# Patient Record
Sex: MALE | Race: WHITE | NOT HISPANIC OR LATINO
[De-identification: names, ages, dates, MRNs, and addresses within clinical notes are randomized per-mention and may not be internally consistent; named-entity substitution may affect disease eponyms.]

---

## 2017-01-18 ENCOUNTER — FORM ENCOUNTER (OUTPATIENT)
Age: 77
End: 2017-01-18

## 2017-01-19 ENCOUNTER — APPOINTMENT (OUTPATIENT)
Dept: FAMILY MEDICINE | Facility: CLINIC | Age: 77
End: 2017-01-19

## 2017-01-19 ENCOUNTER — OUTPATIENT (OUTPATIENT)
Dept: OUTPATIENT SERVICES | Facility: HOSPITAL | Age: 77
LOS: 1 days | End: 2017-01-19
Payer: COMMERCIAL

## 2017-01-19 ENCOUNTER — APPOINTMENT (OUTPATIENT)
Dept: RADIOLOGY | Facility: CLINIC | Age: 77
End: 2017-01-19

## 2017-01-19 VITALS
DIASTOLIC BLOOD PRESSURE: 60 MMHG | BODY MASS INDEX: 34.27 KG/M2 | HEART RATE: 84 BPM | WEIGHT: 267 LBS | SYSTOLIC BLOOD PRESSURE: 142 MMHG | HEIGHT: 74 IN

## 2017-01-19 DIAGNOSIS — M25.532 PAIN IN LEFT WRIST: ICD-10-CM

## 2017-01-19 DIAGNOSIS — E03.9 HYPOTHYROIDISM, UNSPECIFIED: ICD-10-CM

## 2017-01-19 DIAGNOSIS — M25.432 EFFUSION, LEFT WRIST: ICD-10-CM

## 2017-01-19 PROCEDURE — 73110 X-RAY EXAM OF WRIST: CPT

## 2017-01-20 ENCOUNTER — APPOINTMENT (OUTPATIENT)
Dept: ORTHOPEDIC SURGERY | Facility: CLINIC | Age: 77
End: 2017-01-20

## 2017-01-20 VITALS
HEART RATE: 79 BPM | DIASTOLIC BLOOD PRESSURE: 60 MMHG | BODY MASS INDEX: 34.27 KG/M2 | SYSTOLIC BLOOD PRESSURE: 138 MMHG | HEIGHT: 74 IN | WEIGHT: 267 LBS

## 2017-01-20 DIAGNOSIS — M19.032 PRIMARY OSTEOARTHRITIS, LEFT WRIST: ICD-10-CM

## 2017-01-20 DIAGNOSIS — M25.032: ICD-10-CM

## 2017-01-22 PROBLEM — M25.432 WRIST SWELLING, LEFT: Status: ACTIVE | Noted: 2017-01-22

## 2017-01-22 PROBLEM — M25.532 LEFT WRIST PAIN: Status: ACTIVE | Noted: 2017-01-19

## 2017-01-27 ENCOUNTER — APPOINTMENT (OUTPATIENT)
Dept: ORTHOPEDIC SURGERY | Facility: CLINIC | Age: 77
End: 2017-01-27

## 2017-01-27 VITALS
SYSTOLIC BLOOD PRESSURE: 160 MMHG | DIASTOLIC BLOOD PRESSURE: 78 MMHG | BODY MASS INDEX: 32.08 KG/M2 | WEIGHT: 258 LBS | HEIGHT: 75 IN | TEMPERATURE: 97.5 F | HEART RATE: 71 BPM

## 2017-01-27 DIAGNOSIS — M19.90 UNSPECIFIED OSTEOARTHRITIS, UNSPECIFIED SITE: ICD-10-CM

## 2017-01-31 LAB
ANA PAT FLD IF-IMP: ABNORMAL
ANA SER IF-ACNC: ABNORMAL
ERYTHROCYTE [SEDIMENTATION RATE] IN BLOOD BY WESTERGREN METHOD: 57 MM/HR
RHEUMATOID FACT SER QL: 10.4 IU/ML
T4 FREE SERPL-MCNC: 1.2 NG/DL
TSH SERPL-ACNC: 4.05 UIU/ML
URATE SERPL-MCNC: 8.7 MG/DL

## 2017-02-07 ENCOUNTER — EMERGENCY (EMERGENCY)
Facility: HOSPITAL | Age: 77
LOS: 1 days | Discharge: HOME | End: 2017-02-07
Attending: EMERGENCY MEDICINE
Payer: COMMERCIAL

## 2017-02-07 VITALS
SYSTOLIC BLOOD PRESSURE: 160 MMHG | TEMPERATURE: 98 F | RESPIRATION RATE: 20 BRPM | HEART RATE: 70 BPM | OXYGEN SATURATION: 95 % | DIASTOLIC BLOOD PRESSURE: 66 MMHG | WEIGHT: 265 LBS

## 2017-02-07 VITALS
OXYGEN SATURATION: 96 % | SYSTOLIC BLOOD PRESSURE: 158 MMHG | DIASTOLIC BLOOD PRESSURE: 68 MMHG | RESPIRATION RATE: 20 BRPM | TEMPERATURE: 98 F | HEART RATE: 74 BPM

## 2017-02-07 DIAGNOSIS — R10.32 LEFT LOWER QUADRANT PAIN: ICD-10-CM

## 2017-02-07 DIAGNOSIS — J44.9 CHRONIC OBSTRUCTIVE PULMONARY DISEASE, UNSPECIFIED: ICD-10-CM

## 2017-02-07 DIAGNOSIS — Z88.0 ALLERGY STATUS TO PENICILLIN: ICD-10-CM

## 2017-02-07 DIAGNOSIS — M25.552 PAIN IN LEFT HIP: ICD-10-CM

## 2017-02-07 DIAGNOSIS — E11.9 TYPE 2 DIABETES MELLITUS WITHOUT COMPLICATIONS: ICD-10-CM
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
## 2017-02-07 DIAGNOSIS — S73.102A UNSPECIFIED SPRAIN OF LEFT HIP, INITIAL ENCOUNTER: ICD-10-CM
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
## 2017-02-07 LAB
ANION GAP SERPL CALC-SCNC: 15 MMOL/L — SIGNIFICANT CHANGE UP (ref 5–17)
APTT BLD: 42.9 SEC — HIGH (ref 27.5–37.4)
BUN SERPL-MCNC: 35 MG/DL — HIGH (ref 8–20)
CALCIUM SERPL-MCNC: 9.3 MG/DL — SIGNIFICANT CHANGE UP (ref 8.6–10.2)
CHLORIDE SERPL-SCNC: 98 MMOL/L — SIGNIFICANT CHANGE UP (ref 98–107)
CO2 SERPL-SCNC: 24 MMOL/L — SIGNIFICANT CHANGE UP (ref 22–29)
CREAT SERPL-MCNC: 1.5 MG/DL — HIGH (ref 0.5–1.3)
GLUCOSE SERPL-MCNC: 108 MG/DL — SIGNIFICANT CHANGE UP (ref 70–115)
HCT VFR BLD CALC: 34.8 % — LOW (ref 42–52)
HGB BLD-MCNC: 11 G/DL — LOW (ref 14–18)
INR BLD: 2.33 RATIO — HIGH (ref 0.88–1.16)
MCHC RBC-ENTMCNC: 23.3 PG — LOW (ref 27–31)
MCHC RBC-ENTMCNC: 31.6 G/DL — LOW (ref 32–36)
MCV RBC AUTO: 73.6 FL — LOW (ref 80–94)
PLATELET # BLD AUTO: 202 K/UL — SIGNIFICANT CHANGE UP (ref 150–400)
POTASSIUM SERPL-MCNC: 3.6 MMOL/L — SIGNIFICANT CHANGE UP (ref 3.5–5.3)
POTASSIUM SERPL-SCNC: 3.6 MMOL/L — SIGNIFICANT CHANGE UP (ref 3.5–5.3)
PROTHROM AB SERPL-ACNC: 25.9 SEC — HIGH (ref 10–13.1)
RBC # BLD: 4.73 M/UL — SIGNIFICANT CHANGE UP (ref 4.6–6.2)
RBC # FLD: 18.3 % — HIGH (ref 11–15.6)
SODIUM SERPL-SCNC: 137 MMOL/L — SIGNIFICANT CHANGE UP (ref 135–145)
WBC # BLD: 8.99 K/UL — SIGNIFICANT CHANGE UP (ref 4.8–10.8)
WBC # FLD AUTO: 8.99 K/UL — SIGNIFICANT CHANGE UP (ref 4.8–10.8)

## 2017-02-07 PROCEDURE — 99285 EMERGENCY DEPT VISIT HI MDM: CPT

## 2017-02-07 PROCEDURE — 73501 X-RAY EXAM HIP UNI 1 VIEW: CPT | Mod: 26,LT

## 2017-02-07 PROCEDURE — 96374 THER/PROPH/DIAG INJ IV PUSH: CPT | Mod: XU

## 2017-02-07 PROCEDURE — 73501 X-RAY EXAM HIP UNI 1 VIEW: CPT

## 2017-02-07 PROCEDURE — 85610 PROTHROMBIN TIME: CPT

## 2017-02-07 PROCEDURE — 80048 BASIC METABOLIC PNL TOTAL CA: CPT

## 2017-02-07 PROCEDURE — 74176 CT ABD & PELVIS W/O CONTRAST: CPT | Mod: 26

## 2017-02-07 PROCEDURE — 99284 EMERGENCY DEPT VISIT MOD MDM: CPT | Mod: 25

## 2017-02-07 PROCEDURE — 74176 CT ABD & PELVIS W/O CONTRAST: CPT

## 2017-02-07 PROCEDURE — 85027 COMPLETE CBC AUTOMATED: CPT

## 2017-02-07 PROCEDURE — 85730 THROMBOPLASTIN TIME PARTIAL: CPT

## 2017-02-07 RX ORDER — SODIUM CHLORIDE 9 MG/ML
3 INJECTION INTRAMUSCULAR; INTRAVENOUS; SUBCUTANEOUS ONCE
Qty: 0 | Refills: 0 | Status: COMPLETED | OUTPATIENT
Start: 2017-02-07 | End: 2017-02-07

## 2017-02-07 RX ORDER — MORPHINE SULFATE 50 MG/1
4 CAPSULE, EXTENDED RELEASE ORAL ONCE
Qty: 0 | Refills: 0 | Status: DISCONTINUED | OUTPATIENT
Start: 2017-02-07 | End: 2017-02-07

## 2017-02-07 RX ADMIN — MORPHINE SULFATE 4 MILLIGRAM(S): 50 CAPSULE, EXTENDED RELEASE ORAL at 22:45

## 2017-02-07 RX ADMIN — MORPHINE SULFATE 4 MILLIGRAM(S): 50 CAPSULE, EXTENDED RELEASE ORAL at 22:10

## 2017-02-07 RX ADMIN — SODIUM CHLORIDE 3 MILLILITER(S): 9 INJECTION INTRAMUSCULAR; INTRAVENOUS; SUBCUTANEOUS at 22:10

## 2017-02-07 NOTE — ED ADULT NURSE NOTE - OBJECTIVE STATEMENT
Pt c/o left groin pain.  Pt denies injury to area.  States he has chronic cough and believes it onset after coughing.  Cough makes pain worse.  Limited ROM in left leg due to pain.  Denies pain, sob. No swelling noted to area. No acute distress noted.  Pt has bilat pitting edema to lower extremities. Red/warm to touch.

## 2017-02-07 NOTE — ED PROVIDER NOTE - PMH
AAA (abdominal aortic aneurysm)    COPD (chronic obstructive pulmonary disease)    Kidney disease    Neuropathy

## 2017-02-07 NOTE — ED PROVIDER NOTE - GENITOURINARY NEGATIVE STATEMENT, MLM
How Severe Are Your Spot(S)?: mild Have Your Spot(S) Been Treated In The Past?: has not been treated Hpi Title: Evaluation of Skin Lesions Year Removed: 1900 no dysuria, no frequency, and no hematuria.

## 2017-02-07 NOTE — ED PROVIDER NOTE - NS ED MD SCRIBE ATTENDING SCRIBE SECTIONS
VITAL SIGNS( Pullset)/REVIEW OF SYSTEMS/DISPOSITION/PHYSICAL EXAM/HISTORY OF PRESENT ILLNESS/PAST MEDICAL/SURGICAL/SOCIAL HISTORY

## 2017-02-07 NOTE — ED PROVIDER NOTE - PROGRESS NOTE DETAILS
labs and ct discussed, distal sensory, motor intact, passing urine,   possible muscular pain, heat to affected area, follow up with medical doctor, for possible outpatient pt

## 2017-02-07 NOTE — ED PROVIDER NOTE - OBJECTIVE STATEMENT
75 y/o M on Coumadin with h/o DM, Afib, AAA, neuropathy, and multiple falls in recent past with the most recent in the last 2 weeks. Pt c/o gradual worsening increasing L groin pain that is worse with movement. Pt took Tramadol and muscle relaxers this afternoon with some relief. Pt with no prior h/o similar pain. He denies any CP, diaphoresis, or SOB. He normally walks with a walker, but he is now having difficulty bearing weight.

## 2017-02-07 NOTE — ED STATDOCS - DETAILS:
I, Brenda Grubbs, performed the initial face to face bedside interview with this patient regarding history of present illness, review of symptoms and relevant past medical, social and family history.  I completed an independent physical examination.  I was the initial provider who evaluated this patient.   The history, relevant review of systems, past medical and surgical history, medical decision making, and physical examination was documented by the scribe in my presence and I attest to the accuracy of the documentation.

## 2017-02-07 NOTE — ED ADULT NURSE NOTE - PMH
AAA (abdominal aortic aneurysm)    Afib    COPD (chronic obstructive pulmonary disease)    Kidney disease    Neuropathy

## 2017-02-07 NOTE — ED PROVIDER NOTE - DETAILS:
I, SOLANGE May MD, personally performed the services described in the documentation, reviewed the documentation recorded by the scribe in my presence and it accurately and completely records my words and action

## 2017-02-07 NOTE — ED STATDOCS - PROGRESS NOTE DETAILS
75 y/o M pt w/ PMHx of COPD, neuropathy and AAA presents to the ED c/o groin pain onset yesterday. Pt states that he has COPD and was coughing when he felt like he ruptured a hernia. Pt states that he has stents in his groin that he is worried about. Pt denies diarrhea, vomiting, fever, chills, blood in stool, testicular pain, or any other complaints. Pt is allergic to Penicillin. Pt w/ L inguinal pain after multiple episodes of severe coughing w/ COPD. Family very concerned it could be AAA as has hx. Told pt's family it is likely muscular strain, but pt's family insists on further evaluation. Focused evaluation, protocol orders entered, placed in main for complete evaluation by another provider.

## 2017-02-07 NOTE — ED PROVIDER NOTE - MUSCULOSKELETAL, MLM
Pt laying with L hip flexed, severe distress with movement of L hip. Nontender L thigh.  Distal legs with trace pitting edema.

## 2017-02-07 NOTE — ED ADULT TRIAGE NOTE - CHIEF COMPLAINT QUOTE
lt side groin pain. has rt side hernia repaired, and repaired AAA with stent. no hematuria, no n/v, no chest pain, no dyspnea. was to see PMD today but never made it since he was getting coumadin checked instead.

## 2017-02-15 ENCOUNTER — EMERGENCY (EMERGENCY)
Facility: HOSPITAL | Age: 77
LOS: 1 days | Discharge: HOME | End: 2017-02-15
Attending: EMERGENCY MEDICINE | Admitting: EMERGENCY MEDICINE
Payer: COMMERCIAL

## 2017-02-15 ENCOUNTER — APPOINTMENT (OUTPATIENT)
Dept: FAMILY MEDICINE | Facility: CLINIC | Age: 77
End: 2017-02-15

## 2017-02-15 VITALS
OXYGEN SATURATION: 94 % | RESPIRATION RATE: 18 BRPM | HEART RATE: 73 BPM | TEMPERATURE: 98 F | SYSTOLIC BLOOD PRESSURE: 179 MMHG | WEIGHT: 270.07 LBS | HEIGHT: 75 IN | DIASTOLIC BLOOD PRESSURE: 78 MMHG

## 2017-02-15 VITALS
DIASTOLIC BLOOD PRESSURE: 60 MMHG | HEART RATE: 88 BPM | HEIGHT: 75 IN | SYSTOLIC BLOOD PRESSURE: 148 MMHG | WEIGHT: 272 LBS | BODY MASS INDEX: 33.82 KG/M2

## 2017-02-15 DIAGNOSIS — K46.9 UNSPECIFIED ABDOMINAL HERNIA WITHOUT OBSTRUCTION OR GANGRENE: ICD-10-CM

## 2017-02-15 DIAGNOSIS — K40.90 UNILATERAL INGUINAL HERNIA, W/OUT OBSTRUCTION OR GANGRENE, NOT SPECIFIED AS RECURRENT: ICD-10-CM

## 2017-02-15 DIAGNOSIS — R10.32 LEFT LOWER QUADRANT PAIN: ICD-10-CM

## 2017-02-15 DIAGNOSIS — G62.9 POLYNEUROPATHY, UNSPECIFIED: ICD-10-CM

## 2017-02-15 DIAGNOSIS — Z88.0 ALLERGY STATUS TO PENICILLIN: ICD-10-CM

## 2017-02-15 DIAGNOSIS — Z23 ENCOUNTER FOR IMMUNIZATION: ICD-10-CM

## 2017-02-15 LAB
ALBUMIN SERPL ELPH-MCNC: 3.7 G/DL — SIGNIFICANT CHANGE UP (ref 3.3–5.2)
ALP SERPL-CCNC: 74 U/L — SIGNIFICANT CHANGE UP (ref 40–120)
ALT FLD-CCNC: 15 U/L — SIGNIFICANT CHANGE UP
ANION GAP SERPL CALC-SCNC: 16 MMOL/L — SIGNIFICANT CHANGE UP (ref 5–17)
APPEARANCE UR: CLEAR — SIGNIFICANT CHANGE UP
APTT BLD: 36.6 SEC — SIGNIFICANT CHANGE UP (ref 27.5–37.4)
AST SERPL-CCNC: 19 U/L — SIGNIFICANT CHANGE UP
BASOPHILS # BLD AUTO: 0 K/UL — SIGNIFICANT CHANGE UP (ref 0–0.2)
BASOPHILS NFR BLD AUTO: 0.2 % — SIGNIFICANT CHANGE UP (ref 0–2)
BILIRUB SERPL-MCNC: 0.4 MG/DL — SIGNIFICANT CHANGE UP (ref 0.4–2)
BILIRUB UR-MCNC: NEGATIVE — SIGNIFICANT CHANGE UP
BUN SERPL-MCNC: 31 MG/DL — HIGH (ref 8–20)
CALCIUM SERPL-MCNC: 9.4 MG/DL — SIGNIFICANT CHANGE UP (ref 8.6–10.2)
CHLORIDE SERPL-SCNC: 97 MMOL/L — LOW (ref 98–107)
CO2 SERPL-SCNC: 24 MMOL/L — SIGNIFICANT CHANGE UP (ref 22–29)
COLOR SPEC: YELLOW — SIGNIFICANT CHANGE UP
CREAT SERPL-MCNC: 1.39 MG/DL — HIGH (ref 0.5–1.3)
DIFF PNL FLD: ABNORMAL
EOSINOPHIL # BLD AUTO: 0.2 K/UL — SIGNIFICANT CHANGE UP (ref 0–0.5)
EOSINOPHIL NFR BLD AUTO: 2.5 % — SIGNIFICANT CHANGE UP (ref 0–5)
EPI CELLS # UR: SIGNIFICANT CHANGE UP
GLUCOSE SERPL-MCNC: 110 MG/DL — SIGNIFICANT CHANGE UP (ref 70–115)
GLUCOSE UR QL: NEGATIVE MG/DL — SIGNIFICANT CHANGE UP
HCT VFR BLD CALC: 35.9 % — LOW (ref 42–52)
HGB BLD-MCNC: 11.4 G/DL — LOW (ref 14–18)
INR BLD: 1.91 RATIO — HIGH (ref 0.88–1.16)
KETONES UR-MCNC: NEGATIVE — SIGNIFICANT CHANGE UP
LEUKOCYTE ESTERASE UR-ACNC: NEGATIVE — SIGNIFICANT CHANGE UP
LIDOCAIN IGE QN: 46 U/L — SIGNIFICANT CHANGE UP (ref 22–51)
LYMPHOCYTES # BLD AUTO: 1.1 K/UL — SIGNIFICANT CHANGE UP (ref 1–4.8)
LYMPHOCYTES # BLD AUTO: 11.3 % — LOW (ref 20–55)
MCHC RBC-ENTMCNC: 23.5 PG — LOW (ref 27–31)
MCHC RBC-ENTMCNC: 31.8 G/DL — LOW (ref 32–36)
MCV RBC AUTO: 73.9 FL — LOW (ref 80–94)
MONOCYTES # BLD AUTO: 1 K/UL — HIGH (ref 0–0.8)
MONOCYTES NFR BLD AUTO: 9.9 % — SIGNIFICANT CHANGE UP (ref 3–10)
NEUTROPHILS # BLD AUTO: 7.2 K/UL — SIGNIFICANT CHANGE UP (ref 1.8–8)
NEUTROPHILS NFR BLD AUTO: 75.5 % — HIGH (ref 37–73)
NITRITE UR-MCNC: NEGATIVE — SIGNIFICANT CHANGE UP
PH UR: 6 — SIGNIFICANT CHANGE UP (ref 4.8–8)
PLATELET # BLD AUTO: 251 K/UL — SIGNIFICANT CHANGE UP (ref 150–400)
POTASSIUM SERPL-MCNC: 3.7 MMOL/L — SIGNIFICANT CHANGE UP (ref 3.5–5.3)
POTASSIUM SERPL-SCNC: 3.7 MMOL/L — SIGNIFICANT CHANGE UP (ref 3.5–5.3)
PROT SERPL-MCNC: 7.3 G/DL — SIGNIFICANT CHANGE UP (ref 6.6–8.7)
PROT UR-MCNC: 15 MG/DL
PROTHROM AB SERPL-ACNC: 21.1 SEC — HIGH (ref 10–13.1)
RBC # BLD: 4.86 M/UL — SIGNIFICANT CHANGE UP (ref 4.6–6.2)
RBC # FLD: 18.2 % — HIGH (ref 11–15.6)
RBC CASTS # UR COMP ASSIST: SIGNIFICANT CHANGE UP /HPF (ref 0–4)
SODIUM SERPL-SCNC: 137 MMOL/L — SIGNIFICANT CHANGE UP (ref 135–145)
SP GR SPEC: 1.01 — SIGNIFICANT CHANGE UP (ref 1.01–1.02)
UROBILINOGEN FLD QL: NEGATIVE MG/DL — SIGNIFICANT CHANGE UP
WBC # BLD: 9.6 K/UL — SIGNIFICANT CHANGE UP (ref 4.8–10.8)
WBC # FLD AUTO: 9.6 K/UL — SIGNIFICANT CHANGE UP (ref 4.8–10.8)
WBC UR QL: SIGNIFICANT CHANGE UP

## 2017-02-15 PROCEDURE — 87086 URINE CULTURE/COLONY COUNT: CPT

## 2017-02-15 PROCEDURE — 85610 PROTHROMBIN TIME: CPT

## 2017-02-15 PROCEDURE — 81001 URINALYSIS AUTO W/SCOPE: CPT

## 2017-02-15 PROCEDURE — 96374 THER/PROPH/DIAG INJ IV PUSH: CPT

## 2017-02-15 PROCEDURE — 99053 MED SERV 10PM-8AM 24 HR FAC: CPT

## 2017-02-15 PROCEDURE — 80053 COMPREHEN METABOLIC PANEL: CPT

## 2017-02-15 PROCEDURE — 85027 COMPLETE CBC AUTOMATED: CPT

## 2017-02-15 PROCEDURE — 83690 ASSAY OF LIPASE: CPT

## 2017-02-15 PROCEDURE — 99284 EMERGENCY DEPT VISIT MOD MDM: CPT | Mod: 25

## 2017-02-15 PROCEDURE — 85730 THROMBOPLASTIN TIME PARTIAL: CPT

## 2017-02-15 RX ORDER — WHEELCHAIR
EACH MISCELLANEOUS
Qty: 1 | Refills: 0 | Status: ACTIVE | OUTPATIENT
Start: 2017-02-15

## 2017-02-15 RX ORDER — MORPHINE SULFATE 50 MG/1
4 CAPSULE, EXTENDED RELEASE ORAL ONCE
Qty: 0 | Refills: 0 | Status: DISCONTINUED | OUTPATIENT
Start: 2017-02-15 | End: 2017-02-15

## 2017-02-15 RX ORDER — TRAMADOL HYDROCHLORIDE 50 MG/1
50 TABLET, COATED ORAL
Qty: 60 | Refills: 0 | Status: ACTIVE | COMMUNITY

## 2017-02-15 RX ORDER — CHLORZOXAZONE 500 MG/1
500 TABLET ORAL TWICE DAILY
Refills: 0 | Status: ACTIVE | COMMUNITY

## 2017-02-15 RX ADMIN — MORPHINE SULFATE 4 MILLIGRAM(S): 50 CAPSULE, EXTENDED RELEASE ORAL at 06:42

## 2017-02-15 NOTE — ED PROVIDER NOTE - PHYSICAL EXAMINATION
Gastrointestinal: soft, distended (pt states this is normal for him), +LLQ/inguinal tenderness with palpation, no rebound/guarding    Musculoskeletal: no spinal deformities/step offs, decreased ROM due to pain, + right paraspinal lumbar tenderness with palpation, no midline tenderness, 2+ pulses in UE b/l, 2+ femoral pulses b/l, 1+ dp/pt pulses b/l

## 2017-02-15 NOTE — ED PROVIDER NOTE - ATTENDING CONTRIBUTION TO CARE
76y old with repeat visit to er, with left ing pain, no obvious bulge was seen and evaluated, plan to check a ua, ct reviewed patient has a small hernia, most likely appears small when patient is lying flat, no skin chnages outpatient follow up with surgery discussed

## 2017-02-15 NOTE — ED PROVIDER NOTE - OBJECTIVE STATEMENT
76M h/o AAA repaired 14 years ago, A-fix (on coumadin), htn, dm, chronic kidney disease, hypothyroid and hld presents to the ED c/o intermittent, sharp, 10/10 LLQ/inguinal pain x 1 day. Pt states that he "fell to his knees" at the onset of pain. Pt was seen last week in the ED for the same issue with a negative CT scan/xrays. Pt denies recent trauma, LOC, hitting his head, fever, chills, c/p, sob, n/v/c/d, dysuria, numbness/tingling/weakness, saddle anesthesia, urinary/bowel incontinence/retention, IVDA and has no other complaints.

## 2017-02-15 NOTE — ED ADULT TRIAGE NOTE - CHIEF COMPLAINT QUOTE
Pt was walking to bathroom and "felt spasm come on", pt with hx of chronic back pain, denies pain at this time but states "when the pain's there it's a 10"

## 2017-02-15 NOTE — ED PROVIDER NOTE - PROGRESS NOTE DETAILS
pt c/o left groin pain, intermittent x 1 week.  Pt states that pain comes on randomly and usually lasts approx 40-50 seconds and then resolves.  Pt states that symptoms occur approx 20x per day.  Denies nausea/vomiting, diarrhea, constipation.  Pt has COPD - denies shortness of breath or worsening cough.  Patient denies hematuria or any other complaints. - Pain most likely due to left inguinal hernia - given sx referral.  incidental findings discussed with pt - pt has appt with PMD at 10am this morning.  Pedal pulses intact, full muscle strength - pt's ability to walk is at baseline status

## 2017-02-16 LAB
CULTURE RESULTS: NO GROWTH — SIGNIFICANT CHANGE UP
SPECIMEN SOURCE: SIGNIFICANT CHANGE UP

## 2017-02-20 ENCOUNTER — APPOINTMENT (OUTPATIENT)
Dept: NEPHROLOGY | Facility: CLINIC | Age: 77
End: 2017-02-20

## 2017-02-20 ENCOUNTER — OUTPATIENT (OUTPATIENT)
Dept: OUTPATIENT SERVICES | Facility: HOSPITAL | Age: 77
LOS: 1 days | End: 2017-02-20
Payer: COMMERCIAL

## 2017-02-20 ENCOUNTER — APPOINTMENT (OUTPATIENT)
Dept: RADIOLOGY | Facility: CLINIC | Age: 77
End: 2017-02-20

## 2017-02-20 VITALS
SYSTOLIC BLOOD PRESSURE: 144 MMHG | HEIGHT: 73 IN | BODY MASS INDEX: 35.78 KG/M2 | WEIGHT: 270 LBS | DIASTOLIC BLOOD PRESSURE: 60 MMHG

## 2017-02-20 DIAGNOSIS — M1A.09X0 IDIOPATHIC CHRONIC GOUT, MULTIPLE SITES, WITHOUT TOPHUS (TOPHI): ICD-10-CM

## 2017-02-20 DIAGNOSIS — Z79.899 OTHER LONG TERM (CURRENT) DRUG THERAPY: ICD-10-CM

## 2017-02-20 DIAGNOSIS — N18.3 CHRONIC KIDNEY DISEASE, STAGE 3 (MODERATE): ICD-10-CM

## 2017-02-20 DIAGNOSIS — E55.9 VITAMIN D DEFICIENCY, UNSPECIFIED: ICD-10-CM

## 2017-02-20 DIAGNOSIS — I35.9 NONRHEUMATIC AORTIC VALVE DISORDER, UNSPECIFIED: ICD-10-CM

## 2017-02-20 PROCEDURE — 73630 X-RAY EXAM OF FOOT: CPT

## 2017-02-20 PROCEDURE — 73630 X-RAY EXAM OF FOOT: CPT | Mod: 26,50

## 2017-02-23 ENCOUNTER — OTHER (OUTPATIENT)
Age: 77
End: 2017-02-23

## 2017-02-23 RX ORDER — WHEELCHAIR
EACH MISCELLANEOUS
Qty: 1 | Refills: 0 | Status: ACTIVE | OUTPATIENT
Start: 2017-02-23

## 2017-03-03 ENCOUNTER — APPOINTMENT (OUTPATIENT)
Dept: SURGERY | Facility: CLINIC | Age: 77
End: 2017-03-03

## 2017-03-03 VITALS
WEIGHT: 264.03 LBS | OXYGEN SATURATION: 98 % | DIASTOLIC BLOOD PRESSURE: 66 MMHG | TEMPERATURE: 97.5 F | RESPIRATION RATE: 14 BRPM | SYSTOLIC BLOOD PRESSURE: 158 MMHG | HEIGHT: 71 IN | BODY MASS INDEX: 36.96 KG/M2 | HEART RATE: 63 BPM

## 2017-03-03 DIAGNOSIS — R05 COUGH: ICD-10-CM

## 2017-03-03 DIAGNOSIS — R10.30 LOWER ABDOMINAL PAIN, UNSPECIFIED: ICD-10-CM

## 2017-03-03 DIAGNOSIS — Z86.39 PERSONAL HISTORY OF OTHER ENDOCRINE, NUTRITIONAL AND METABOLIC DISEASE: ICD-10-CM

## 2017-03-03 DIAGNOSIS — Z82.61 FAMILY HISTORY OF ARTHRITIS: ICD-10-CM

## 2017-03-03 DIAGNOSIS — Z85.46 PERSONAL HISTORY OF MALIGNANT NEOPLASM OF PROSTATE: ICD-10-CM

## 2017-03-03 DIAGNOSIS — Z83.49 FAMILY HISTORY OF OTHER ENDOCRINE, NUTRITIONAL AND METABOLIC DISEASES: ICD-10-CM

## 2017-03-03 DIAGNOSIS — Z82.49 FAMILY HISTORY OF ISCHEMIC HEART DISEASE AND OTHER DISEASES OF THE CIRCULATORY SYSTEM: ICD-10-CM

## 2017-03-03 DIAGNOSIS — Z86.79 PERSONAL HISTORY OF OTHER DISEASES OF THE CIRCULATORY SYSTEM: ICD-10-CM

## 2017-03-03 DIAGNOSIS — Z85.828 PERSONAL HISTORY OF OTHER MALIGNANT NEOPLASM OF SKIN: ICD-10-CM

## 2017-03-03 DIAGNOSIS — I72.9 ANEURYSM OF UNSPECIFIED SITE: ICD-10-CM

## 2017-03-03 DIAGNOSIS — I83.11 VARICOSE VEINS OF RIGHT LOWER EXTREMITY WITH INFLAMMATION: ICD-10-CM

## 2017-03-03 DIAGNOSIS — Z87.448 PERSONAL HISTORY OF OTHER DISEASES OF URINARY SYSTEM: ICD-10-CM

## 2017-03-03 DIAGNOSIS — I83.12 VARICOSE VEINS OF RIGHT LOWER EXTREMITY WITH INFLAMMATION: ICD-10-CM

## 2017-03-03 DIAGNOSIS — J44.9 CHRONIC OBSTRUCTIVE PULMONARY DISEASE, UNSPECIFIED: ICD-10-CM

## 2017-03-05 PROBLEM — K40.90 LEFT INGUINAL HERNIA: Status: ACTIVE | Noted: 2017-03-03

## 2017-03-20 ENCOUNTER — APPOINTMENT (OUTPATIENT)
Dept: VASCULAR SURGERY | Facility: CLINIC | Age: 77
End: 2017-03-20

## 2017-03-20 ENCOUNTER — APPOINTMENT (OUTPATIENT)
Dept: FAMILY MEDICINE | Facility: CLINIC | Age: 77
End: 2017-03-20

## 2017-03-20 VITALS
HEIGHT: 71 IN | SYSTOLIC BLOOD PRESSURE: 140 MMHG | BODY MASS INDEX: 36.4 KG/M2 | TEMPERATURE: 97.6 F | OXYGEN SATURATION: 98 % | HEART RATE: 70 BPM | RESPIRATION RATE: 18 BRPM | WEIGHT: 260 LBS | DIASTOLIC BLOOD PRESSURE: 80 MMHG

## 2017-03-20 VITALS
HEIGHT: 70.5 IN | SYSTOLIC BLOOD PRESSURE: 150 MMHG | OXYGEN SATURATION: 97 % | DIASTOLIC BLOOD PRESSURE: 61 MMHG | WEIGHT: 260 LBS | TEMPERATURE: 97.8 F | HEART RATE: 67 BPM | RESPIRATION RATE: 14 BRPM | BODY MASS INDEX: 36.81 KG/M2

## 2017-03-20 DIAGNOSIS — M21.40 FLAT FOOT [PES PLANUS] (ACQUIRED), UNSPECIFIED FOOT: ICD-10-CM

## 2017-03-20 DIAGNOSIS — M48.06 SPINAL STENOSIS, LUMBAR REGION: ICD-10-CM

## 2017-03-20 DIAGNOSIS — M10.9 GOUT, UNSPECIFIED: ICD-10-CM

## 2017-03-20 DIAGNOSIS — R60.9 EDEMA, UNSPECIFIED: ICD-10-CM

## 2017-03-20 DIAGNOSIS — Z01.818 ENCOUNTER FOR OTHER PREPROCEDURAL EXAMINATION: ICD-10-CM

## 2017-03-20 DIAGNOSIS — I25.10 ATHEROSCLEROTIC HEART DISEASE OF NATIVE CORONARY ARTERY W/OUT ANGINA PECTORIS: ICD-10-CM

## 2017-03-20 DIAGNOSIS — I50.9 HEART FAILURE, UNSPECIFIED: ICD-10-CM

## 2017-03-20 RX ORDER — METHYLPREDNISOLONE 4 MG/1
4 TABLET ORAL
Qty: 1 | Refills: 0 | Status: DISCONTINUED | COMMUNITY
Start: 2017-01-20 | End: 2017-03-20

## 2017-03-20 RX ORDER — FUROSEMIDE 40 MG/1
40 TABLET ORAL
Refills: 0 | Status: ACTIVE | COMMUNITY

## 2017-03-20 RX ORDER — LEVOTHYROXINE SODIUM 0.07 MG/1
75 TABLET ORAL DAILY
Qty: 90 | Refills: 1 | Status: ACTIVE | COMMUNITY
Start: 2016-12-15

## 2017-03-21 ENCOUNTER — RX RENEWAL (OUTPATIENT)
Age: 77
End: 2017-03-21

## 2017-03-21 LAB
ALBUMIN SERPL ELPH-MCNC: 4.3 G/DL
ALP BLD-CCNC: 80 U/L
ALT SERPL-CCNC: 31 U/L
ANION GAP SERPL CALC-SCNC: 16 MMOL/L
AST SERPL-CCNC: 36 U/L
BASOPHILS # BLD AUTO: 0.03 K/UL
BASOPHILS NFR BLD AUTO: 0.3 %
BILIRUB SERPL-MCNC: 0.4 MG/DL
BUN SERPL-MCNC: 34 MG/DL
CALCIUM SERPL-MCNC: 9.7 MG/DL
CHLORIDE SERPL-SCNC: 99 MMOL/L
CO2 SERPL-SCNC: 24 MMOL/L
CREAT SERPL-MCNC: 1.57 MG/DL
EOSINOPHIL # BLD AUTO: 0.32 K/UL
EOSINOPHIL NFR BLD AUTO: 3.5 %
GLUCOSE SERPL-MCNC: 105 MG/DL
HCT VFR BLD CALC: 38.9 %
HGB BLD-MCNC: 11.5 G/DL
IMM GRANULOCYTES NFR BLD AUTO: 0.4 %
INR PPP: 1.48 RATIO
LYMPHOCYTES # BLD AUTO: 1.54 K/UL
LYMPHOCYTES NFR BLD AUTO: 17.1 %
MAN DIFF?: NORMAL
MCHC RBC-ENTMCNC: 22.8 PG
MCHC RBC-ENTMCNC: 29.6 GM/DL
MCV RBC AUTO: 77.2 FL
MONOCYTES # BLD AUTO: 0.67 K/UL
MONOCYTES NFR BLD AUTO: 7.4 %
NEUTROPHILS # BLD AUTO: 6.43 K/UL
NEUTROPHILS NFR BLD AUTO: 71.3 %
PLATELET # BLD AUTO: 246 K/UL
POTASSIUM SERPL-SCNC: 4.3 MMOL/L
PROT SERPL-MCNC: 7.3 G/DL
PT BLD: 16.8 SEC
RBC # BLD: 5.04 M/UL
RBC # FLD: 19.2 %
SODIUM SERPL-SCNC: 139 MMOL/L
WBC # FLD AUTO: 9.03 K/UL

## 2017-03-21 RX ORDER — POTASSIUM CHLORIDE 750 MG/1
10 TABLET, FILM COATED, EXTENDED RELEASE ORAL TWICE DAILY
Qty: 180 | Refills: 1 | Status: ACTIVE | COMMUNITY
Start: 2017-02-20 | End: 1900-01-01

## 2017-03-22 ENCOUNTER — TRANSCRIPTION ENCOUNTER (OUTPATIENT)
Age: 77
End: 2017-03-22

## 2017-03-22 ENCOUNTER — OUTPATIENT (OUTPATIENT)
Dept: OUTPATIENT SERVICES | Facility: HOSPITAL | Age: 77
LOS: 1 days | End: 2017-03-22
Payer: COMMERCIAL

## 2017-03-22 DIAGNOSIS — M54.17 RADICULOPATHY, LUMBOSACRAL REGION: ICD-10-CM

## 2017-03-31 ENCOUNTER — APPOINTMENT (OUTPATIENT)
Dept: SURGERY | Facility: CLINIC | Age: 77
End: 2017-03-31

## 2017-04-04 ENCOUNTER — APPOINTMENT (OUTPATIENT)
Dept: FAMILY MEDICINE | Facility: CLINIC | Age: 77
End: 2017-04-04

## 2017-04-04 VITALS
DIASTOLIC BLOOD PRESSURE: 60 MMHG | HEIGHT: 70.5 IN | SYSTOLIC BLOOD PRESSURE: 148 MMHG | TEMPERATURE: 97.9 F | HEART RATE: 84 BPM | WEIGHT: 261 LBS | BODY MASS INDEX: 36.95 KG/M2

## 2017-04-04 DIAGNOSIS — I48.0 PAROXYSMAL ATRIAL FIBRILLATION: ICD-10-CM

## 2017-04-04 DIAGNOSIS — D69.2 OTHER NONTHROMBOCYTOPENIC PURPURA: ICD-10-CM

## 2017-04-04 DIAGNOSIS — Z79.01 LONG TERM (CURRENT) USE OF ANTICOAGULANTS: ICD-10-CM

## 2017-04-04 LAB
INR PPP: 1.8 RATIO
POCT-PROTHROMBIN TIME: 22 SECS
QUALITY CONTROL: YES

## 2017-04-04 RX ORDER — COLCHICINE 0.6 MG/1
0.6 TABLET ORAL
Refills: 0 | Status: ACTIVE | COMMUNITY

## 2017-04-04 RX ORDER — ALLOPURINOL 100 MG/1
100 TABLET ORAL TWICE DAILY
Qty: 180 | Refills: 1 | Status: ACTIVE | COMMUNITY

## 2017-04-05 ENCOUNTER — MEDICATION RENEWAL (OUTPATIENT)
Age: 77
End: 2017-04-05

## 2017-04-11 ENCOUNTER — OTHER (OUTPATIENT)
Age: 77
End: 2017-04-11

## 2017-04-11 ENCOUNTER — APPOINTMENT (OUTPATIENT)
Dept: FAMILY MEDICINE | Facility: CLINIC | Age: 77
End: 2017-04-11

## 2017-04-11 DIAGNOSIS — Z20.828 CONTACT WITH AND (SUSPECTED) EXPOSURE TO OTHER VIRAL COMMUNICABLE DISEASES: ICD-10-CM

## 2017-04-11 RX ORDER — OSELTAMIVIR PHOSPHATE 75 MG/1
75 CAPSULE ORAL
Qty: 10 | Refills: 0 | Status: ACTIVE | COMMUNITY
Start: 2017-04-11 | End: 1900-01-01

## 2017-04-12 ENCOUNTER — INPATIENT (INPATIENT)
Facility: HOSPITAL | Age: 77
LOS: 10 days | Discharge: NURSING, REHAB OR LONG-TERM CARE | DRG: 291 | End: 2017-04-23
Attending: HOSPITALIST | Admitting: HOSPITALIST
Payer: COMMERCIAL

## 2017-04-12 ENCOUNTER — OTHER (OUTPATIENT)
Age: 77
End: 2017-04-12

## 2017-04-12 VITALS
RESPIRATION RATE: 18 BRPM | HEIGHT: 74 IN | HEART RATE: 57 BPM | WEIGHT: 259.93 LBS | DIASTOLIC BLOOD PRESSURE: 64 MMHG | TEMPERATURE: 98 F | SYSTOLIC BLOOD PRESSURE: 112 MMHG | OXYGEN SATURATION: 94 %

## 2017-04-12 DIAGNOSIS — I48.91 UNSPECIFIED ATRIAL FIBRILLATION: ICD-10-CM

## 2017-04-12 DIAGNOSIS — N18.3 CHRONIC KIDNEY DISEASE, STAGE 3 (MODERATE): ICD-10-CM

## 2017-04-12 DIAGNOSIS — M51.9 UNSPECIFIED THORACIC, THORACOLUMBAR AND LUMBOSACRAL INTERVERTEBRAL DISC DISORDER: ICD-10-CM

## 2017-04-12 DIAGNOSIS — Z95.1 PRESENCE OF AORTOCORONARY BYPASS GRAFT: Chronic | ICD-10-CM

## 2017-04-12 DIAGNOSIS — Z95.2 PRESENCE OF PROSTHETIC HEART VALVE: Chronic | ICD-10-CM

## 2017-04-12 DIAGNOSIS — G62.9 POLYNEUROPATHY, UNSPECIFIED: ICD-10-CM

## 2017-04-12 DIAGNOSIS — I50.9 HEART FAILURE, UNSPECIFIED: ICD-10-CM

## 2017-04-12 DIAGNOSIS — M1A.20X0 DRUG-INDUCED CHRONIC GOUT, UNSPECIFIED SITE, WITHOUT TOPHUS (TOPHI): ICD-10-CM

## 2017-04-12 DIAGNOSIS — I15.9 SECONDARY HYPERTENSION, UNSPECIFIED: ICD-10-CM

## 2017-04-12 DIAGNOSIS — Z90.49 ACQUIRED ABSENCE OF OTHER SPECIFIED PARTS OF DIGESTIVE TRACT: Chronic | ICD-10-CM

## 2017-04-12 LAB
ALBUMIN SERPL ELPH-MCNC: 2.8 G/DL — LOW (ref 3.3–5.2)
ALP SERPL-CCNC: 60 U/L — SIGNIFICANT CHANGE UP (ref 40–120)
ALT FLD-CCNC: 29 U/L — SIGNIFICANT CHANGE UP
ANION GAP SERPL CALC-SCNC: 17 MMOL/L — SIGNIFICANT CHANGE UP (ref 5–17)
APTT BLD: 31 SEC — SIGNIFICANT CHANGE UP (ref 27.5–37.4)
AST SERPL-CCNC: 46 U/L — HIGH
BASOPHILS # BLD AUTO: 0 K/UL — SIGNIFICANT CHANGE UP (ref 0–0.2)
BASOPHILS NFR BLD AUTO: 0.1 % — SIGNIFICANT CHANGE UP (ref 0–2)
BILIRUB SERPL-MCNC: 0.3 MG/DL — LOW (ref 0.4–2)
BUN SERPL-MCNC: 46 MG/DL — HIGH (ref 8–20)
CALCIUM SERPL-MCNC: 8.5 MG/DL — LOW (ref 8.6–10.2)
CHLORIDE SERPL-SCNC: 95 MMOL/L — LOW (ref 98–107)
CK MB CFR SERPL CALC: 3.7 NG/ML — SIGNIFICANT CHANGE UP (ref 0–6.7)
CK SERPL-CCNC: 253 U/L — HIGH (ref 30–200)
CO2 SERPL-SCNC: 20 MMOL/L — LOW (ref 22–29)
CREAT SERPL-MCNC: 2.17 MG/DL — HIGH (ref 0.5–1.3)
GLUCOSE SERPL-MCNC: 102 MG/DL — SIGNIFICANT CHANGE UP (ref 70–115)
HCT VFR BLD CALC: 32.3 % — LOW (ref 42–52)
HGB BLD-MCNC: 10.4 G/DL — LOW (ref 14–18)
INR BLD: 1.61 RATIO — HIGH (ref 0.88–1.16)
LYMPHOCYTES # BLD AUTO: 0.9 K/UL — LOW (ref 1–4.8)
LYMPHOCYTES # BLD AUTO: 10.1 % — LOW (ref 20–55)
MCHC RBC-ENTMCNC: 23.7 PG — LOW (ref 27–31)
MCHC RBC-ENTMCNC: 32.2 G/DL — SIGNIFICANT CHANGE UP (ref 32–36)
MCV RBC AUTO: 73.7 FL — LOW (ref 80–94)
MONOCYTES # BLD AUTO: 0.6 K/UL — SIGNIFICANT CHANGE UP (ref 0–0.8)
MONOCYTES NFR BLD AUTO: 7.3 % — SIGNIFICANT CHANGE UP (ref 3–10)
NEUTROPHILS # BLD AUTO: 7.1 K/UL — SIGNIFICANT CHANGE UP (ref 1.8–8)
NEUTROPHILS NFR BLD AUTO: 82.1 % — HIGH (ref 37–73)
NT-PROBNP SERPL-SCNC: 2522 PG/ML — HIGH (ref 0–300)
PLATELET # BLD AUTO: 155 K/UL — SIGNIFICANT CHANGE UP (ref 150–400)
POTASSIUM SERPL-MCNC: 4.2 MMOL/L — SIGNIFICANT CHANGE UP (ref 3.5–5.3)
POTASSIUM SERPL-SCNC: 4.2 MMOL/L — SIGNIFICANT CHANGE UP (ref 3.5–5.3)
PROT SERPL-MCNC: 6.7 G/DL — SIGNIFICANT CHANGE UP (ref 6.6–8.7)
PROTHROM AB SERPL-ACNC: 17.9 SEC — HIGH (ref 9.8–12.7)
RBC # BLD: 4.38 M/UL — LOW (ref 4.6–6.2)
RBC # FLD: 19.5 % — HIGH (ref 11–15.6)
SODIUM SERPL-SCNC: 132 MMOL/L — LOW (ref 135–145)
TROPONIN T SERPL-MCNC: 0.02 NG/ML — SIGNIFICANT CHANGE UP (ref 0–0.06)
WBC # BLD: 8.6 K/UL — SIGNIFICANT CHANGE UP (ref 4.8–10.8)
WBC # FLD AUTO: 8.6 K/UL — SIGNIFICANT CHANGE UP (ref 4.8–10.8)

## 2017-04-12 PROCEDURE — 93010 ELECTROCARDIOGRAM REPORT: CPT

## 2017-04-12 PROCEDURE — 99285 EMERGENCY DEPT VISIT HI MDM: CPT

## 2017-04-12 PROCEDURE — 70450 CT HEAD/BRAIN W/O DYE: CPT | Mod: 26

## 2017-04-12 PROCEDURE — 71010: CPT | Mod: 26

## 2017-04-12 RX ORDER — COLCHICINE 0.6 MG
0.6 TABLET ORAL
Qty: 0 | Refills: 0 | Status: DISCONTINUED | OUTPATIENT
Start: 2017-04-12 | End: 2017-04-23

## 2017-04-12 RX ORDER — FUROSEMIDE 40 MG
40 TABLET ORAL
Qty: 0 | Refills: 0 | Status: DISCONTINUED | OUTPATIENT
Start: 2017-04-12 | End: 2017-04-20

## 2017-04-12 RX ORDER — AMLODIPINE BESYLATE 2.5 MG/1
5 TABLET ORAL DAILY
Qty: 0 | Refills: 0 | Status: DISCONTINUED | OUTPATIENT
Start: 2017-04-12 | End: 2017-04-23

## 2017-04-12 RX ORDER — SODIUM CHLORIDE 9 MG/ML
3 INJECTION INTRAMUSCULAR; INTRAVENOUS; SUBCUTANEOUS ONCE
Qty: 0 | Refills: 0 | Status: COMPLETED | OUTPATIENT
Start: 2017-04-12 | End: 2017-04-12

## 2017-04-12 RX ORDER — IPRATROPIUM/ALBUTEROL SULFATE 18-103MCG
3 AEROSOL WITH ADAPTER (GRAM) INHALATION ONCE
Qty: 0 | Refills: 0 | Status: COMPLETED | OUTPATIENT
Start: 2017-04-12 | End: 2017-04-12

## 2017-04-12 RX ORDER — WARFARIN SODIUM 2.5 MG/1
5 TABLET ORAL ONCE
Qty: 0 | Refills: 0 | Status: COMPLETED | OUTPATIENT
Start: 2017-04-12 | End: 2017-04-12

## 2017-04-12 RX ORDER — GABAPENTIN 400 MG/1
300 CAPSULE ORAL
Qty: 0 | Refills: 0 | Status: DISCONTINUED | OUTPATIENT
Start: 2017-04-12 | End: 2017-04-23

## 2017-04-12 RX ORDER — ATORVASTATIN CALCIUM 80 MG/1
10 TABLET, FILM COATED ORAL AT BEDTIME
Qty: 0 | Refills: 0 | Status: DISCONTINUED | OUTPATIENT
Start: 2017-04-12 | End: 2017-04-23

## 2017-04-12 RX ORDER — ALLOPURINOL 300 MG
100 TABLET ORAL DAILY
Qty: 0 | Refills: 0 | Status: DISCONTINUED | OUTPATIENT
Start: 2017-04-12 | End: 2017-04-23

## 2017-04-12 RX ORDER — KETOROLAC TROMETHAMINE 30 MG/ML
30 SYRINGE (ML) INJECTION ONCE
Qty: 0 | Refills: 0 | Status: DISCONTINUED | OUTPATIENT
Start: 2017-04-12 | End: 2017-04-12

## 2017-04-12 RX ORDER — PANTOPRAZOLE SODIUM 20 MG/1
40 TABLET, DELAYED RELEASE ORAL
Qty: 0 | Refills: 0 | Status: DISCONTINUED | OUTPATIENT
Start: 2017-04-12 | End: 2017-04-23

## 2017-04-12 RX ORDER — ALBUTEROL 90 UG/1
2.5 AEROSOL, METERED ORAL ONCE
Qty: 0 | Refills: 0 | Status: COMPLETED | OUTPATIENT
Start: 2017-04-12 | End: 2017-04-12

## 2017-04-12 RX ORDER — FUROSEMIDE 40 MG
40 TABLET ORAL ONCE
Qty: 0 | Refills: 0 | Status: COMPLETED | OUTPATIENT
Start: 2017-04-12 | End: 2017-04-12

## 2017-04-12 RX ORDER — METOPROLOL TARTRATE 50 MG
50 TABLET ORAL
Qty: 0 | Refills: 0 | Status: DISCONTINUED | OUTPATIENT
Start: 2017-04-12 | End: 2017-04-23

## 2017-04-12 RX ORDER — ONDANSETRON 8 MG/1
4 TABLET, FILM COATED ORAL EVERY 6 HOURS
Qty: 0 | Refills: 0 | Status: DISCONTINUED | OUTPATIENT
Start: 2017-04-12 | End: 2017-04-23

## 2017-04-12 RX ORDER — SODIUM CHLORIDE 9 MG/ML
3 INJECTION INTRAMUSCULAR; INTRAVENOUS; SUBCUTANEOUS EVERY 8 HOURS
Qty: 0 | Refills: 0 | Status: DISCONTINUED | OUTPATIENT
Start: 2017-04-12 | End: 2017-04-23

## 2017-04-12 RX ORDER — TRAMADOL HYDROCHLORIDE 50 MG/1
50 TABLET ORAL EVERY 6 HOURS
Qty: 0 | Refills: 0 | Status: DISCONTINUED | OUTPATIENT
Start: 2017-04-12 | End: 2017-04-17

## 2017-04-12 RX ADMIN — Medication 40 MILLIGRAM(S): at 16:18

## 2017-04-12 RX ADMIN — SODIUM CHLORIDE 3 MILLILITER(S): 9 INJECTION INTRAMUSCULAR; INTRAVENOUS; SUBCUTANEOUS at 16:21

## 2017-04-12 RX ADMIN — Medication 3 MILLILITER(S): at 17:31

## 2017-04-12 RX ADMIN — ATORVASTATIN CALCIUM 10 MILLIGRAM(S): 80 TABLET, FILM COATED ORAL at 23:09

## 2017-04-12 RX ADMIN — ALBUTEROL 2.5 MILLIGRAM(S): 90 AEROSOL, METERED ORAL at 16:18

## 2017-04-12 RX ADMIN — WARFARIN SODIUM 5 MILLIGRAM(S): 2.5 TABLET ORAL at 22:59

## 2017-04-12 RX ADMIN — TRAMADOL HYDROCHLORIDE 50 MILLIGRAM(S): 50 TABLET ORAL at 23:09

## 2017-04-12 RX ADMIN — SODIUM CHLORIDE 3 MILLILITER(S): 9 INJECTION INTRAMUSCULAR; INTRAVENOUS; SUBCUTANEOUS at 23:00

## 2017-04-12 RX ADMIN — Medication 30 MILLIGRAM(S): at 17:28

## 2017-04-12 NOTE — H&P ADULT - PROBLEM SELECTOR PLAN 6
Cont coumadin, check PT/INR in AM. Family advised patient likely not candidate for long term A/C due to multiple falls. Will await PT eval for formal assessment.

## 2017-04-12 NOTE — H&P ADULT - HISTORY OF PRESENT ILLNESS
78 y/o male with history of CHF with unknown type currently, AVR with tisse valve, CAD s/p CABG, HTN, HLD, Gout, chronic LE edema, chronic back pain, AAA s/p open repair, Peripheral neuropathy, COPD not on home 02, chronic afib on coumadin brought in by family after increasing generalized weakness, and increasing leg edema, orthopnea, and falls earlier today. No reported head trauma, LOC. No focal weakness. Patient also with poor appetite over last few week. No reported bleeding, N/V/D. In the ED patient with evidence of decompensated CHF. Family also inquiring about LIUDMILA as they are aware currently patient is requiring increased care at home which can not safely be given by wife.

## 2017-04-12 NOTE — H&P ADULT - ASSESSMENT
76 y/o male with acute on chronic CHF of unclear etiology, Hx of Afib, HLD, HTN, Neuropathy, AVR, COPD, CKD-3, CAD s/p CABG, Gout, Chronic back pain

## 2017-04-12 NOTE — ED ADULT NURSE NOTE - OBJECTIVE STATEMENT
family states that he has been falling frequently and the last week he has been having difficulty walking. pt c/o right groin pain that has gotten worse over the last 2 days. pt was told by his PMD the pain stems from his chronic back problems. pt states that he fell today x2 once in living room then in bathroom while using walker. pt awake and alert denies any LOC today denies dizziness.

## 2017-04-12 NOTE — PATIENT PROFILE ADULT. - PMH
AAA (abdominal aortic aneurysm)    Acute on chronic congestive heart failure, unspecified congestive heart failure type    Afib    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, unspecified site    CKD (chronic kidney disease) stage 3, GFR 30-59 ml/min    COPD (chronic obstructive pulmonary disease)    Kidney disease    Lumbar disc disease    Neuropathy    Secondary hypertension    Vasculitis determined by biopsy of skin

## 2017-04-12 NOTE — H&P ADULT - PMH
AAA (abdominal aortic aneurysm)    Acute on chronic congestive heart failure, unspecified congestive heart failure type    Afib    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, unspecified site    CKD (chronic kidney disease) stage 3, GFR 30-59 ml/min    COPD (chronic obstructive pulmonary disease)    Kidney disease    Lumbar disc disease    Neuropathy    Secondary hypertension

## 2017-04-12 NOTE — ED PROVIDER NOTE - PROGRESS NOTE DETAILS
pt with falls and increase BLE swelling found with CHF on CXR and BNP.  will admit.  case d/w Ani and will admit

## 2017-04-12 NOTE — ED ADULT NURSE REASSESSMENT NOTE - NS ED NURSE REASSESS COMMENT FT1
Called MD Munguia regarding need for orders for pt to go to admit bed, MD states "we'll be down as soon as we can"

## 2017-04-12 NOTE — ED PROVIDER NOTE - CARE PLAN
Principal Discharge DX:	Congestive heart failure, unspecified congestive heart failure chronicity, unspecified congestive heart failure type

## 2017-04-12 NOTE — H&P ADULT - PROBLEM SELECTOR PLAN 1
Admit to tele, no obvious ischemia, f/u echo to asses prior AVR, EF, wall motion. Cont. IV lasix, no ACE-I with worsening renal failure. Cardiology eval. Obtain prior records from Dr. Villagran office. Cont. BB, Norvasc. DVT-P, fall risk protocol, PT eval

## 2017-04-12 NOTE — ED ADULT NURSE REASSESSMENT NOTE - NS ED NURSE REASSESS COMMENT FT1
patient received in bed and on Cardiac monitor. wheezing, and wet cough, patient is alert and oriented x3, on Nasal Canula oxygenation at 95% oxygenation. awaiting Hospitalist to admit patient. Patient has bed. family at bedside, will continue to monitor.

## 2017-04-12 NOTE — ED PROVIDER NOTE - OBJECTIVE STATEMENT
78 y/o male in ED c/o worsening BLE swelling and pain with difficulty ambulating x 1 wk.   pt states legs gave way twice today with falls.  pt denies any LOC, HA, head trauma, neck pain, cp, sob, n/ 76 y/o male in ED c/o worsening BLE swelling and pain with difficulty ambulating x 1 wk.   pt states legs gave way twice today with falls.  pt denies any LOC, HA, head trauma, neck pain, cp, sob, n/v/d/abd pain.

## 2017-04-12 NOTE — ED ADULT NURSE NOTE - PMH
AAA (abdominal aortic aneurysm)    Afib    COPD (chronic obstructive pulmonary disease)    Kidney disease    Lumbar disc disease    Neuropathy

## 2017-04-13 DIAGNOSIS — J10.1 INFLUENZA DUE TO OTHER IDENTIFIED INFLUENZA VIRUS WITH OTHER RESPIRATORY MANIFESTATIONS: ICD-10-CM

## 2017-04-13 LAB
ANION GAP SERPL CALC-SCNC: 16 MMOL/L — SIGNIFICANT CHANGE UP (ref 5–17)
ANISOCYTOSIS BLD QL: SLIGHT — SIGNIFICANT CHANGE UP
BASOPHILS NFR BLD AUTO: 2 % — SIGNIFICANT CHANGE UP (ref 0–2)
BUN SERPL-MCNC: 47 MG/DL — HIGH (ref 8–20)
CALCIUM SERPL-MCNC: 8.3 MG/DL — LOW (ref 8.6–10.2)
CHLORIDE SERPL-SCNC: 98 MMOL/L — SIGNIFICANT CHANGE UP (ref 98–107)
CO2 SERPL-SCNC: 20 MMOL/L — LOW (ref 22–29)
CREAT SERPL-MCNC: 2.31 MG/DL — HIGH (ref 0.5–1.3)
EOSINOPHIL NFR BLD AUTO: 1 % — SIGNIFICANT CHANGE UP (ref 0–6)
FLUBV RNA SPEC QL NAA+PROBE: DETECTED
GLUCOSE SERPL-MCNC: 98 MG/DL — SIGNIFICANT CHANGE UP (ref 70–115)
HCT VFR BLD CALC: 30.4 % — LOW (ref 42–52)
HGB BLD-MCNC: 9.6 G/DL — LOW (ref 14–18)
INR BLD: 1.79 RATIO — HIGH (ref 0.88–1.16)
LYMPHOCYTES # BLD AUTO: 13 % — LOW (ref 20–55)
MACROCYTES BLD QL: SLIGHT — SIGNIFICANT CHANGE UP
MAGNESIUM SERPL-MCNC: 1.9 MG/DL — SIGNIFICANT CHANGE UP (ref 1.8–2.5)
MCHC RBC-ENTMCNC: 23.6 PG — LOW (ref 27–31)
MCHC RBC-ENTMCNC: 31.6 G/DL — LOW (ref 32–36)
MCV RBC AUTO: 74.7 FL — LOW (ref 80–94)
MICROCYTES BLD QL: SLIGHT — SIGNIFICANT CHANGE UP
MONOCYTES NFR BLD AUTO: 9 % — SIGNIFICANT CHANGE UP (ref 3–10)
NEUTROPHILS NFR BLD AUTO: 74 % — HIGH (ref 37–73)
OVALOCYTES BLD QL SMEAR: SLIGHT — SIGNIFICANT CHANGE UP
PHOSPHATE SERPL-MCNC: 3.5 MG/DL — SIGNIFICANT CHANGE UP (ref 2.4–4.7)
PLAT MORPH BLD: NORMAL — SIGNIFICANT CHANGE UP
PLATELET # BLD AUTO: 122 K/UL — LOW (ref 150–400)
POIKILOCYTOSIS BLD QL AUTO: SLIGHT — SIGNIFICANT CHANGE UP
POTASSIUM SERPL-MCNC: 3.2 MMOL/L — LOW (ref 3.5–5.3)
POTASSIUM SERPL-SCNC: 3.2 MMOL/L — LOW (ref 3.5–5.3)
PROTHROM AB SERPL-ACNC: 19.9 SEC — HIGH (ref 9.8–12.7)
RAPID RVP RESULT: DETECTED
RBC # BLD: 4.07 M/UL — LOW (ref 4.6–6.2)
RBC # FLD: 19.3 % — HIGH (ref 11–15.6)
RBC BLD AUTO: ABNORMAL
SODIUM SERPL-SCNC: 134 MMOL/L — LOW (ref 135–145)
TSH SERPL-MCNC: 3.41 UIU/ML — SIGNIFICANT CHANGE UP (ref 0.27–4.2)
VARIANT LYMPHS # BLD: 1 % — SIGNIFICANT CHANGE UP (ref 0–6)
WBC # BLD: 4.3 K/UL — LOW (ref 4.8–10.8)
WBC # FLD AUTO: 4.3 K/UL — LOW (ref 4.8–10.8)

## 2017-04-13 PROCEDURE — 99233 SBSQ HOSP IP/OBS HIGH 50: CPT

## 2017-04-13 PROCEDURE — 99223 1ST HOSP IP/OBS HIGH 75: CPT

## 2017-04-13 RX ORDER — CYCLOBENZAPRINE HYDROCHLORIDE 10 MG/1
10 TABLET, FILM COATED ORAL THREE TIMES A DAY
Qty: 0 | Refills: 0 | Status: DISCONTINUED | OUTPATIENT
Start: 2017-04-13 | End: 2017-04-23

## 2017-04-13 RX ADMIN — Medication 75 MILLIGRAM(S): at 18:51

## 2017-04-13 RX ADMIN — SODIUM CHLORIDE 3 MILLILITER(S): 9 INJECTION INTRAMUSCULAR; INTRAVENOUS; SUBCUTANEOUS at 12:25

## 2017-04-13 RX ADMIN — Medication 50 MILLIGRAM(S): at 05:39

## 2017-04-13 RX ADMIN — ATORVASTATIN CALCIUM 10 MILLIGRAM(S): 80 TABLET, FILM COATED ORAL at 21:58

## 2017-04-13 RX ADMIN — GABAPENTIN 300 MILLIGRAM(S): 400 CAPSULE ORAL at 05:40

## 2017-04-13 RX ADMIN — Medication 0.6 MILLIGRAM(S): at 18:50

## 2017-04-13 RX ADMIN — AMLODIPINE BESYLATE 5 MILLIGRAM(S): 2.5 TABLET ORAL at 05:40

## 2017-04-13 RX ADMIN — GABAPENTIN 300 MILLIGRAM(S): 400 CAPSULE ORAL at 18:50

## 2017-04-13 RX ADMIN — SODIUM CHLORIDE 3 MILLILITER(S): 9 INJECTION INTRAMUSCULAR; INTRAVENOUS; SUBCUTANEOUS at 05:32

## 2017-04-13 RX ADMIN — TRAMADOL HYDROCHLORIDE 50 MILLIGRAM(S): 50 TABLET ORAL at 00:00

## 2017-04-13 RX ADMIN — Medication 100 MILLIGRAM(S): at 12:26

## 2017-04-13 RX ADMIN — TRAMADOL HYDROCHLORIDE 50 MILLIGRAM(S): 50 TABLET ORAL at 16:25

## 2017-04-13 RX ADMIN — SODIUM CHLORIDE 3 MILLILITER(S): 9 INJECTION INTRAMUSCULAR; INTRAVENOUS; SUBCUTANEOUS at 22:33

## 2017-04-13 RX ADMIN — Medication 40 MILLIGRAM(S): at 05:38

## 2017-04-13 RX ADMIN — CYCLOBENZAPRINE HYDROCHLORIDE 10 MILLIGRAM(S): 10 TABLET, FILM COATED ORAL at 21:58

## 2017-04-13 RX ADMIN — Medication 40 MILLIGRAM(S): at 18:50

## 2017-04-13 RX ADMIN — Medication 50 MILLIGRAM(S): at 18:50

## 2017-04-13 RX ADMIN — TRAMADOL HYDROCHLORIDE 50 MILLIGRAM(S): 50 TABLET ORAL at 15:41

## 2017-04-13 RX ADMIN — PANTOPRAZOLE SODIUM 40 MILLIGRAM(S): 20 TABLET, DELAYED RELEASE ORAL at 05:40

## 2017-04-13 NOTE — CONSULT NOTE ADULT - SUBJECTIVE AND OBJECTIVE BOX
CARDIOLOGY CONSULTATION NOTE   Consult requested by:      Reason for Consultation:     History obtained by: Patient, family and medical record     obtained: No    Chief complaint:    Patient is a 77y old  Male who presents with a chief complaint of my legs collapsed (12 Apr 2017 22:43)      HPI:  78 yo M with hx Gout, COPD, HTN, HLD, CHF, s/p AVR  (bio) and  CABG x1 eight years ago,  s/p open repair of AAA,  PAF on coumadin, chronic leg edema brought in by family after increasing generalized weakness, and increasing leg edema,had falls prior to admission. Denied syncope, focal weakness, LOC. Admitted poor appetite over last few week. Denied N/V/D, melena, rectal bleed, bleeding issues.     REVIEW OF SYMPTOMS:   Constitutional: Denied: fever, chills, weight loss or gain  Eyes: Denied: reddened ayes, eye discharge, eye pain  ENMT: Denied: ear pain, nasal discharge, mouth pain, throat pain or swelling  Cardiovascular: Denied: chest pain,  Chest pressure  Respiratory: Denied: cough, phlegm production, wheezes, dyspnea, orthopnea, PND,   GI: Denied: Abdominal pain, nausea, vomiting, diarrhea, constipation, melena, rectal bleed  : Denied: hematuria, frequency  Musculoskeletal: Denied: Muscle aches, weakness, pain  Hematology/lymp: Denied: Bleeding disorder, anemia, blood clotting  Neuro: Denied: Headache, light headedness, dizziness, numbness, aphasia, dysarthria, seizure,  syncope, near syncope  Psych: Denied: depression, anxiety  Integumentary/skin: Denied: rash, bruises, ecchymosis, itching  Allergy/Immunology: denied environmental or drug allergies    ALL OTHER REVIEW OF SYSTEMS ARE NEGATIVE.    MEDICATIONS  (STANDING):  sodium chloride 0.9% lock flush 3milliLiter(s) IV Push every 8 hours  furosemide   Injectable 40milliGRAM(s) IV Push two times a day  gabapentin 300milliGRAM(s) Oral two times a day  allopurinol 100milliGRAM(s) Oral daily  colchicine 0.6milliGRAM(s) Oral two times a day  pantoprazole    Tablet 40milliGRAM(s) Oral before breakfast  amLODIPine   Tablet 5milliGRAM(s) Oral daily  metoprolol 50milliGRAM(s) Oral two times a day  atorvastatin 10milliGRAM(s) Oral at bedtime    MEDICATIONS  (PRN):  ondansetron Injectable 4milliGRAM(s) IV Push every 6 hours PRN Nausea  traMADol 50milliGRAM(s) Oral every 6 hours PRN Moderate Pain (4 - 6)        PAST MEDICAL & SURGICAL HISTORY:  Vasculitis determined by biopsy of skin  CKD (chronic kidney disease) stage 3, GFR 30-59 ml/min  Secondary hypertension  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, unspecified site  Acute on chronic congestive heart failure, unspecified congestive heart failure type  Lumbar disc disease  Afib  Kidney disease  Neuropathy  COPD (chronic obstructive pulmonary disease)  AAA (abdominal aortic aneurysm)  Aortic valve replaced  S/P appendectomy  S/P CABG x 1  No significant past surgical history      FAMILY HISTORY:  No pertinent family history in first degree relatives      SOCIAL HISTORY:     CIGARETTES:  No    ALCOHOL: No    DRUGS: No    Vital Signs Last 24 Hrs  T(C): 36.5, Max: 36.9 (04-12 @ 14:16)  T(F): 97.7, Max: 98.4 (04-12 @ 14:16)  HR: 60 (56 - 62)  BP: 120/62 (106/52 - 156/67)  BP(mean): 79 (79 - 79)  RR: 22 (18 - 22)  SpO2: 98% (94% - 98%)    PHYSICAL EXAM:      Constitutional: No fever, chills, NAD, Comfortable    Eyes: Not reddened, no discharge    ENMT: No discharge, No pain    Neck: No JVD, No Bruit    Back: No CVA tenderness    Respiratory: Clear to auscultation bilaterally    Cardiovascular: RRR, Normal S1-2, No S3-, No friction rub 1-2/6 SM at RUSB    Gastrointestinal: Soft, NT/ND. BS+, No organomegaly    Extremities: 2+ ankle edema bilaterally with reddened skin    Vascular: Distal pulses intact    Neurological: Alert, awake, oriented, able to move extremities, speech is clear    Skin: No ecchymosis, no rash    Musculoskeletal: Non tender,     Psychiatric: Appropriate mood, no anxiety        INTERPRETATION OF TELEMETRY: SR    ECG: SR, lateral ST - T abnormalities    I&O's Detail    I & Os for current day (as of 13 Apr 2017 09:30)  =============================================  IN:    Oral Fluid: 450 ml    Total IN: 450 ml  ---------------------------------------------  OUT:    Voided: 950 ml    Total OUT: 950 ml  ---------------------------------------------  Total NET: -500 ml      LABS:                        9.6    4.3   )-----------( 122      ( 13 Apr 2017 07:24 )             30.4     04-13    134<L>  |  98  |  47.0<H>  ----------------------------<  98  3.2<L>   |  20.0<L>  |  2.31<H>    Ca    8.3<L>      13 Apr 2017 07:24  Phos  3.5     04-13  Mg     1.9     04-13    TPro  6.7  /  Alb  2.8<L>  /  TBili  0.3<L>  /  DBili  x   /  AST  46<H>  /  ALT  29  /  AlkPhos  60  04-12    CARDIAC MARKERS ( 12 Apr 2017 16:43 )  x     / 0.02 ng/mL / 253 U/L / x     / 3.7 ng/mL      PT/INR - ( 13 Apr 2017 07:24 )   PT: 19.9 sec;   INR: 1.79 ratio         PTT - ( 12 Apr 2017 16:43 )  PTT:31.0 sec    I&O's Summary    I & Os for current day (as of 13 Apr 2017 09:30)  =============================================  IN: 450 ml / OUT: 950 ml / NET: -500 ml      RADIOLOGY & ADDITIONAL STUDIES:  X-ray:    PREVIOUS DIAGNOSTIC TESTING:     ;

## 2017-04-13 NOTE — PHYSICAL THERAPY INITIAL EVALUATION ADULT - ADDITIONAL COMMENTS
Pt lives in a private home with his wife. 5 steps to enter with handrails, no steps inside. Pt was independent PTA with RW. Pt also owns SAC Shower chair and [employer]

## 2017-04-13 NOTE — PROGRESS NOTE ADULT - PROBLEM SELECTOR PLAN 6
Cont coumadin, check PT/INR in AM. Family advised patient likely not candidate for long term A/C due to multiple falls. per PT home Pt vs LIUDMILA. However pt uses walker and has chr lymphedema of legs and family raises concerns. given all this need to reassess A/C defer to cardiology

## 2017-04-13 NOTE — PHYSICAL THERAPY INITIAL EVALUATION ADULT - PERTINENT HX OF CURRENT PROBLEM, REHAB EVAL
78 y/o male with history of CHF with unknown type currently, AVR with tisse valve, CAD s/p CABG, HTN, HLD, Gout, chronic LE edema, chronic back pain, AAA s/p open repair, Peripheral neuropathy, COPD not on home 02, chronic afib on coumadin brought in by family after increasing generalized weakness, and increasing leg edema, orthopnea, and falls admitted for CHF and + flu B

## 2017-04-13 NOTE — PROGRESS NOTE ADULT - SUBJECTIVE AND OBJECTIVE BOX
HEALTH ISSUES - PROBLEM Dx:  HPI:  78 y/o male with history of CHF with unknown type currently, AVR with tisse valve, CAD s/p CABG, HTN, HLD, Gout, chronic LE edema, chronic back pain, AAA s/p open repair, Peripheral neuropathy, COPD not on home 02, chronic afib on coumadin brought in by family after increasing generalized weakness, and increasing leg edema, orthopnea, and falls earlier today. No reported head trauma, LOC. No focal weakness. Patient also with poor appetite over last few week. No reported bleeding, N/V/D. In the ED patient with evidence of decompensated CHF. Family also inquiring about LIUDMILA as they are aware currently patient is requiring increased care at home which can not safely be given by wife. (12 Apr 2017 21:07)    NOW  CHF exacerbation    INTERVAL HPI/ OVERNIGHT EVENTS:  mild sob, otherwise comfortable  denies chest pain, nausea, vomits, cough, fever, HA    MEDICATIONS  (STANDING):  sodium chloride 0.9% lock flush 3milliLiter(s) IV Push every 8 hours  furosemide   Injectable 40milliGRAM(s) IV Push two times a day  gabapentin 300milliGRAM(s) Oral two times a day  allopurinol 100milliGRAM(s) Oral daily  colchicine 0.6milliGRAM(s) Oral two times a day  pantoprazole    Tablet 40milliGRAM(s) Oral before breakfast  amLODIPine   Tablet 5milliGRAM(s) Oral daily  metoprolol 50milliGRAM(s) Oral two times a day  atorvastatin 10milliGRAM(s) Oral at bedtime    MEDICATIONS  (PRN):  ondansetron Injectable 4milliGRAM(s) IV Push every 6 hours PRN Nausea  traMADol 50milliGRAM(s) Oral every 6 hours PRN Moderate Pain (4 - 6)      Allergies    penicillins (Rash)    Intolerances        Vital Signs Last 24 Hrs  T(C): 36.4, Max: 36.5 (04-13 @ 05:34)  T(F): 97.6, Max: 97.7 (04-13 @ 05:34)  HR: 64 (56 - 64)  BP: 110/60 (106/52 - 156/67)  BP(mean): 79 (79 - 79)  RR: 19 (18 - 22)  SpO2: 95% (94% - 98%)    ACCUCHECKS    PHYSICAL EXAM-  GENERAL: well-groomed, well-developed  HEAD:  Atraumatic, Normocephalic  EYES: EOMI, PERRLA, conjunctiva and sclera clear  ENMT:  Moist mucous membranes,   NECK: Supple, No JVD, Normal thyroid  NERVOUS SYSTEM:  Alert & Oriented X 3, Motor Strength 5/5 B/L upper and lower extremities;   CHEST/LUNG: No rales, rhonchi, wheezing, or rubs  HEART: Regular rate and rhythm; No murmurs, rubs, or gallops  ABDOMEN: Soft, Nontender, Nondistended; Bowel sounds present  EXTREMITIES:  2+ Peripheral Pulses, No clubbing, cyanosis; chr lymphedema   LYMPH: No lymphadenopathy noted  SKIN: No rashes or lesions    LABS:                        9.6    4.3   )-----------( 122      ( 13 Apr 2017 07:24 )             30.4     04-13    134<L>  |  98  |  47.0<H>  ----------------------------<  98  3.2<L>   |  20.0<L>  |  2.31<H>    Ca    8.3<L>      13 Apr 2017 07:24  Phos  3.5     04-13  Mg     1.9     04-13    TPro  6.7  /  Alb  2.8<L>  /  TBili  0.3<L>  /  DBili  x   /  AST  46<H>  /  ALT  29  /  AlkPhos  60  04-12    PT/INR - ( 13 Apr 2017 07:24 )   PT: 19.9 sec;   INR: 1.79 ratio         PTT - ( 12 Apr 2017 16:43 )  PTT:31.0 sec    RADIOLOGY & ADDITIONAL TESTS:    Assessment and Plan  DVT Prophylaxis    Discussed with: Patient, family, RN, CM, Consultants  Plan of care/ Discharge planning discussed.    Visit Time:

## 2017-04-14 LAB
ANION GAP SERPL CALC-SCNC: 14 MMOL/L — SIGNIFICANT CHANGE UP (ref 5–17)
BUN SERPL-MCNC: 41 MG/DL — HIGH (ref 8–20)
CALCIUM SERPL-MCNC: 8.3 MG/DL — LOW (ref 8.6–10.2)
CHLORIDE SERPL-SCNC: 100 MMOL/L — SIGNIFICANT CHANGE UP (ref 98–107)
CO2 SERPL-SCNC: 23 MMOL/L — SIGNIFICANT CHANGE UP (ref 22–29)
CREAT SERPL-MCNC: 1.93 MG/DL — HIGH (ref 0.5–1.3)
GLUCOSE SERPL-MCNC: 108 MG/DL — SIGNIFICANT CHANGE UP (ref 70–115)
MAGNESIUM SERPL-MCNC: 1.9 MG/DL — SIGNIFICANT CHANGE UP (ref 1.8–2.5)
POTASSIUM SERPL-MCNC: 3.6 MMOL/L — SIGNIFICANT CHANGE UP (ref 3.5–5.3)
POTASSIUM SERPL-SCNC: 3.6 MMOL/L — SIGNIFICANT CHANGE UP (ref 3.5–5.3)
SODIUM SERPL-SCNC: 137 MMOL/L — SIGNIFICANT CHANGE UP (ref 135–145)

## 2017-04-14 PROCEDURE — 99233 SBSQ HOSP IP/OBS HIGH 50: CPT

## 2017-04-14 RX ORDER — WARFARIN SODIUM 2.5 MG/1
7 TABLET ORAL ONCE
Qty: 0 | Refills: 0 | Status: COMPLETED | OUTPATIENT
Start: 2017-04-14 | End: 2017-04-14

## 2017-04-14 RX ORDER — ALLOPURINOL 300 MG
0 TABLET ORAL
Qty: 0 | Refills: 0 | COMMUNITY

## 2017-04-14 RX ORDER — GABAPENTIN 400 MG/1
0 CAPSULE ORAL
Qty: 0 | Refills: 0 | COMMUNITY

## 2017-04-14 RX ORDER — LEVOTHYROXINE SODIUM 125 MCG
1 TABLET ORAL
Qty: 0 | Refills: 0 | COMMUNITY

## 2017-04-14 RX ORDER — AMLODIPINE BESYLATE 2.5 MG/1
1 TABLET ORAL
Qty: 0 | Refills: 0 | COMMUNITY

## 2017-04-14 RX ADMIN — SODIUM CHLORIDE 3 MILLILITER(S): 9 INJECTION INTRAMUSCULAR; INTRAVENOUS; SUBCUTANEOUS at 21:50

## 2017-04-14 RX ADMIN — GABAPENTIN 300 MILLIGRAM(S): 400 CAPSULE ORAL at 06:32

## 2017-04-14 RX ADMIN — GABAPENTIN 300 MILLIGRAM(S): 400 CAPSULE ORAL at 18:37

## 2017-04-14 RX ADMIN — Medication 40 MILLIGRAM(S): at 06:34

## 2017-04-14 RX ADMIN — AMLODIPINE BESYLATE 5 MILLIGRAM(S): 2.5 TABLET ORAL at 06:32

## 2017-04-14 RX ADMIN — Medication 0.6 MILLIGRAM(S): at 06:32

## 2017-04-14 RX ADMIN — SODIUM CHLORIDE 3 MILLILITER(S): 9 INJECTION INTRAMUSCULAR; INTRAVENOUS; SUBCUTANEOUS at 06:29

## 2017-04-14 RX ADMIN — SODIUM CHLORIDE 3 MILLILITER(S): 9 INJECTION INTRAMUSCULAR; INTRAVENOUS; SUBCUTANEOUS at 11:42

## 2017-04-14 RX ADMIN — Medication 50 MILLIGRAM(S): at 18:37

## 2017-04-14 RX ADMIN — ATORVASTATIN CALCIUM 10 MILLIGRAM(S): 80 TABLET, FILM COATED ORAL at 22:02

## 2017-04-14 RX ADMIN — Medication 75 MILLIGRAM(S): at 06:32

## 2017-04-14 RX ADMIN — Medication 30 MILLIGRAM(S): at 18:37

## 2017-04-14 RX ADMIN — TRAMADOL HYDROCHLORIDE 50 MILLIGRAM(S): 50 TABLET ORAL at 15:44

## 2017-04-14 RX ADMIN — TRAMADOL HYDROCHLORIDE 50 MILLIGRAM(S): 50 TABLET ORAL at 07:14

## 2017-04-14 RX ADMIN — TRAMADOL HYDROCHLORIDE 50 MILLIGRAM(S): 50 TABLET ORAL at 06:29

## 2017-04-14 RX ADMIN — WARFARIN SODIUM 7 MILLIGRAM(S): 2.5 TABLET ORAL at 22:02

## 2017-04-14 RX ADMIN — CYCLOBENZAPRINE HYDROCHLORIDE 10 MILLIGRAM(S): 10 TABLET, FILM COATED ORAL at 15:44

## 2017-04-14 RX ADMIN — TRAMADOL HYDROCHLORIDE 50 MILLIGRAM(S): 50 TABLET ORAL at 16:43

## 2017-04-14 RX ADMIN — Medication 0.6 MILLIGRAM(S): at 18:37

## 2017-04-14 RX ADMIN — Medication 50 MILLIGRAM(S): at 06:32

## 2017-04-14 RX ADMIN — Medication 40 MILLIGRAM(S): at 18:37

## 2017-04-14 RX ADMIN — PANTOPRAZOLE SODIUM 40 MILLIGRAM(S): 20 TABLET, DELAYED RELEASE ORAL at 06:32

## 2017-04-14 RX ADMIN — Medication 100 MILLIGRAM(S): at 11:46

## 2017-04-14 NOTE — PROGRESS NOTE ADULT - SUBJECTIVE AND OBJECTIVE BOX
CARDIOLOGY PROGRESS NOTE   ([facility])                                                                                                        Subjective: Feels better, No CP, dyspnea, leg edema is decreased    Vitals:  T(C): 36.7, Max: 36.7 (04-13 @ 18:54)  HR: 65 (58 - 82)  BP: 138/60 (110/60 - 138/60)  RR: 17 (17 - 22)  SpO2: 97% (95% - 97%)      I & Os for current day (as of 2017 08:34)  =============================================  IN: 720 ml / OUT: 1505 ml / NET: -785 ml        PHYSICAL EXAM:  Appearance: Normal	  HEENT:   Atraumatic  Cardiovascular: Normal S1 S2, No JVD, No murmurs, No edema  Respiratory: Lungs clear to auscultation	  Gastrointestinal:  Soft, Non-tender, + BS	  Skin: No rashes, No ecchymoses, No cyanosis  Neurologic: Alert and awake, able to move extremities  Extremities: + edema      CURRENT MEDICATIONS:  furosemide   Injectable 40milliGRAM(s) IV Push two times a day  amLODIPine   Tablet 5milliGRAM(s) Oral daily  metoprolol 50milliGRAM(s) Oral two times a day    oseltamivir 75milliGRAM(s) Oral two times a day      ondansetron Injectable 4milliGRAM(s) IV Push every 6 hours PRN  gabapentin 300milliGRAM(s) Oral two times a day  traMADol 50milliGRAM(s) Oral every 6 hours PRN  cyclobenzaprine 10milliGRAM(s) Oral three times a day PRN    pantoprazole    Tablet 40milliGRAM(s) Oral before breakfast    allopurinol 100milliGRAM(s) Oral daily  colchicine 0.6milliGRAM(s) Oral two times a day  atorvastatin 10milliGRAM(s) Oral at bedtime        LABS:	 	                          9.6    4.3   )-----------( 122      ( 2017 07:24 )             30.4     04-14    137  |  100  |  41.0<H>  ----------------------------<  108  3.6   |  23.0  |  1.93<H>    Ca    8.3<L>      2017 07:50  Phos  3.5       Mg     1.9         TPro  6.7  /  Alb  2.8<L>  /  TBili  0.3<L>  /  DBili  x   /  AST  46<H>  /  ALT  29  /  AlkPhos  60      proBNP: Serum Pro-Brain Natriuretic Peptide: 2522 pg/mL ( @ 16:43)      TSH: Thyroid Stimulating Hormone, Serum: 3.41 uIU/mL ( @ 07:24)      EXAM:  ECHO TRANSTHORACIC COMP W DOPP      PROCEDURE DATE:  2017   .      INTERPRETATION:  REPORT:    TRANSTHORACIC ECHOCARDIOGRAM REPORT           Patient Name:   CINDI HALE Patient Location: Inpatient  Medical Rec #:  [id number]   Accession #:      [id number]  Account #:                   Height:           74.0 in 188.0 cm  YOB: 1940    Weight:           260.1 lb 118.00 kg  Patient Age:    77 years     BSA:              2.43 m²  Patient Gender: M            BP:               120/62 mmHg        Date of Exam:        2017 11:20:56 AM  Sonographer:         Rosio Mcfarland  Referring Physician: Geovani Cary MD     Procedure:     2D Echo/Doppler/Color Doppler Complete.  Indications:   Heart failure, unspecified - I50.9  Diagnosis:     Heart failure, unspecified - I50.9  Study Details: Technically difficult study. Study quality was adversely   affected                 due to body habitus.           2D AND M-MODE MEASUREMENTS (normal ranges within parentheses):  Left Ventricle:                 Normal    Aorta/Left Atrium:              Normal  IVSd (2D):              1.00 cm (0.7-1.1) LA Volume Index    44.4 ml/m²  LVPWd (2D):             1.26 cm (0.7-1.1) Right Ventricle:  LVIDd (2D):             5.24 cm (3.4-5.7) RVd (2D):        3.61 cm  LVIDs (2D):             3.35 cm  LV FS (2D):             36.1 %  (>25%)  Relative Wall Thickness 0.48    (<0.42)     SPECTRAL DOPPLER ANALYSIS (where applicable):  Aortic Valve: AoV Max Anup: 1.93 m/s AoV Peak P.9 mmHg AoV Mean P.0 mmHg     LVOT Vmax:  LVOT VTI: 0.195 m        Ao VTI: 0.455  Tricuspid Valve and PA/RV Systolic Pressure: TR Max Velocity: 3.27 m/s RA   Pressure: 3 mmHg RVSP/PASP: 45.8 mmHg        PHYSICIAN INTERPRETATION:  Left Ventricle: The left ventricular internal cavity size is normal.   There is mild concentric left ventricular hypertrophy.  Global LV systolic function was normal. Left ventricular ejection   fraction, by visual estimation, is 60 to 65%. The mitral in-flow pattern   reveals no discernable A-wave, therefore no comment on diastolic function   can be made.  Right Ventricle: The right ventricular size is mildly enlarged. RV   systolic function is low normal.  Left Atrium: Moderately enlarged left atrium. Lipomatous hypertrophy of   the intra-atrial septum.  Right Atrium: The right atrium is mildly dilated.  Pericardium: There is no evidence of pericardial effusion.  Mitral Valve: The mitral valve is normal in structure. Thickening and   calcification of the anterior mitral valve leaflet. There is mild mitral   annular calcification. No evidence of mitral stenosis. Mild to moderate   mitral valve regurgitation is seen. The MR jet is centrally-directed.  Tricuspid Valve: Structurally normal tricuspid valve, with normal leaflet   excursion. Mild tricuspid regurgitation is visualized. Estimated   pulmonary artery systolic pressure is 45.8 mmHg assuming a right atrial   pressure of 3 mmHg, which is consistent with mild pulmonary hypertension.  Aortic Valve: A bovine AoV bioprosthesis is visualized. Echo findings are   consistent with normal structure and function, allowing for the   limitations of transthoracic echo techniques of the aortic prosthesis.   Peak aortic valve gradient is 14.9 mmHg and the mean gradient is 8.0   mmHg, which is probably normal in the setting of a prosthetic aortic   valve. No evidence of aortic valve regurgitation is seen.  Pulmonic Valve: Trace pulmonic valve regurgitation.  Aorta: The aortic root is normal in size and structure.  Venous: A systolic blunting flow pattern is recorded from the right upper   pulmonary vein. The inferior vena cava was normal sized, with respiratory   size variation greater than 50%.        Summary:   1. Left ventricular ejection fraction, by visual estimation, is 60 to   65%.   2. Normal global left ventricular systolic function.   3. The mitral in-flow pattern reveals no discernable A-wave, therefore   no comment on diastolic function can be made.   4. Normal left ventricular internal cavity size.   5. There is mild concentric left ventricular hypertrophy.   6. Moderately enlarged left atrium.   7. Mildly dilated right atrium.   8. Mild mitral annular calcification.   9. Thickening and calcification of the anterior mitral valve leaflet.  10. Mild to moderate mitral valve regurgitation.  11. Bovine bioprosthesis in the aortic position.  12. Peak aortic valve gradient is 14.9 mmHg and the mean gradient is 8.0   mmHg, which is probably normal in the setting of a prosthetic aortic   valve.  13. Estimated pulmonary artery systolic pressure is 45.8 mmHg assuming a   right atrial pressure of 3 mmHg, which is consistent with mild pulmonary   hypertension.  14. There is no evidence of pericardial effusion.The right ventricular   size is mildly enlarged. RV systolic function is low normal.     MD Thomas Electronically signed on 2017 at 5:02:53 PM                *** Final ***                  CHANEL SMITH   This document has been electronically signed. 2017 11:20AM

## 2017-04-14 NOTE — PROGRESS NOTE ADULT - SUBJECTIVE AND OBJECTIVE BOX
HEALTH ISSUES - PROBLEM Dx:  HPI:  76 y/o male with history of CHF with unknown type currently, AVR with tisse valve, CAD s/p CABG, HTN, HLD, Gout, chronic LE edema, chronic back pain, AAA s/p open repair, Peripheral neuropathy, COPD not on home 02, chronic afib on coumadin brought in by family after increasing generalized weakness, and increasing leg edema, orthopnea, and falls earlier today. No reported head trauma, LOC. No focal weakness. Patient also with poor appetite over last few week. No reported bleeding, N/V/D. In the ED patient with evidence of decompensated CHF. Family also inquiring about LIUDMILA as they are aware currently patient is requiring increased care at home which can not safely be given by wife. (12 Apr 2017 21:07)    NOW  CHF exacerbation    INTERVAL HPI/ OVERNIGHT EVENTS:  feels better  denies chest pain, nausea, vomits, cough, SOB, fever, HA    MEDICATIONS  (STANDING):  sodium chloride 0.9% lock flush 3milliLiter(s) IV Push every 8 hours  furosemide   Injectable 40milliGRAM(s) IV Push two times a day  gabapentin 300milliGRAM(s) Oral two times a day  allopurinol 100milliGRAM(s) Oral daily  colchicine 0.6milliGRAM(s) Oral two times a day  pantoprazole    Tablet 40milliGRAM(s) Oral before breakfast  amLODIPine   Tablet 5milliGRAM(s) Oral daily  metoprolol 50milliGRAM(s) Oral two times a day  atorvastatin 10milliGRAM(s) Oral at bedtime  warfarin 7milliGRAM(s) Oral once  oseltamivir 30milliGRAM(s) Oral two times a day    MEDICATIONS  (PRN):  ondansetron Injectable 4milliGRAM(s) IV Push every 6 hours PRN Nausea  traMADol 50milliGRAM(s) Oral every 6 hours PRN Moderate Pain (4 - 6)  cyclobenzaprine 10milliGRAM(s) Oral three times a day PRN Muscle Spasm      Allergies    penicillins (Rash)    Intolerances        Vital Signs Last 24 Hrs  T(C): 36.7, Max: 36.7 (04-13 @ 18:54)  T(F): 98.1, Max: 98.1 (04-14 @ 12:13)  HR: 78 (58 - 82)  BP: 124/64 (114/62 - 138/60)  BP(mean): --  RR: 18 (17 - 22)  SpO2: 98% (95% - 98%)    ACCUCHECKS    PHYSICAL EXAM-  GENERAL: well-groomed, well-developed  HEAD:  Atraumatic, Normocephalic  EYES: EOMI, PERRLA, conjunctiva and sclera clear  ENMT:  Moist mucous membranes,   NECK: Supple, No JVD, Normal thyroid  NERVOUS SYSTEM:  Alert & Oriented X 3, Motor Strength 5/5 B/L upper and lower extremities;   CHEST/LUNG: No rales, rhonchi, wheezing, or rubs  HEART: Regular rate and rhythm; No murmurs, rubs, or gallops  ABDOMEN: Soft, Nontender, Nondistended; Bowel sounds present  EXTREMITIES:  2+ Peripheral Pulses, No clubbing, cyanosis; chr lymphedema   LYMPH: No lymphadenopathy noted  SKIN: No rashes or lesions    LABS:                        9.6    4.3   )-----------( 122      ( 13 Apr 2017 07:24 )             30.4     04-14    137  |  100  |  41.0<H>  ----------------------------<  108  3.6   |  23.0  |  1.93<H>    Ca    8.3<L>      14 Apr 2017 07:50  Phos  3.5     04-13  Mg     1.9     04-14    TPro  6.7  /  Alb  2.8<L>  /  TBili  0.3<L>  /  DBili  x   /  AST  46<H>  /  ALT  29  /  AlkPhos  60  04-12    PT/INR - ( 13 Apr 2017 07:24 )   PT: 19.9 sec;   INR: 1.79 ratio         PTT - ( 12 Apr 2017 16:43 )  PTT:31.0 sec    RADIOLOGY & ADDITIONAL TESTS:    Assessment and Plan  DVT Prophylaxis    Discussed with: Patient, family, RN, CM, Consultants  Plan of care/ Discharge planning discussed.    Visit Time:

## 2017-04-14 NOTE — PROGRESS NOTE ADULT - ASSESSMENT
78 yo M with hx Gout, COPD, HTN, HLD, CHF, s/p AVR  (bio) and  CABG x1 eight years ago,  s/p open repair of AAA,  PAF on coumadin, chronic leg edema brought in by family after increasing generalized weakness, and increasing leg edema, had falls prior to admission. Denied syncope, focal weakness, LOC. Admitted poor appetite over last few week. Denied N/V/D, melena, rectal bleed, bleeding issues.     1) Acute on chronic diastolic congestive heart failure  TTE done:   Global LV systolic function was normal. Left ventricular ejection fraction, by visual estimation, is 60 to 65%. The mitral in-flow pattern reveals no discernable A-wave, therefore no comment on diastolic function can be made. Right Ventricle: The right ventricular size is mildly enlarged. RV systolic function is low normal.  Bovine bioprosthesis in the aortic position. Peak aortic valve gradient is 14.9 mmHg and the mean gradient is 8.0 mmHg, which is probably normal in the setting of a prosthetic aortic   valve.  Estimated pulmonary artery systolic pressure is 45.8 mmHg assuming a right atrial pressure of 3 mmHg, which is consistent with mild pulmonary   hypertension.    Benefited from Lasix, leg edema is reduced. Denied dyspnea. Continue Lasix. Check BMP/Creatinine, currently stable.      2) CKD (chronic kidney disease) stage 3, GFR 30-59 ml/min  Stable, FU BMP.    3) Hypertension: well controlled BP controlled      4) PAF, telemetry reviewed: SR/ PAF, HR is at normal range  Stable on Metoprolol  Continue coumadin.    5) Influenza B +  On isolation    6) s/p CABG and AVR (bio)  TTE showed normal function of bioprosthetic aortic valve.    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

## 2017-04-14 NOTE — PROGRESS NOTE ADULT - PROBLEM SELECTOR PLAN 6
Cont coumadin, check PT/INR in AM. Family advised patient likely not candidate for long term A/C due to multiple falls. per PT home Pt vs LIUDMILA. However pt uses walker and has chr lymphedema of legs and family raises concerns. given all this need to reassess A/C defer to cardiology.

## 2017-04-14 NOTE — PROGRESS NOTE ADULT - ASSESSMENT
76 y/o male with acute on chronic CHF of unclear etiology, Hx of Afib, HLD, HTN, Neuropathy, AVR, COPD, CKD-3, CAD s/p CABG, Gout, Chronic back pain admitted with acute chf with preserved EF. no sys or diastolic dysfunction on ECHO. was on coumadin, but family feels he should not be on it due to risk of fall. Cardiology has to opine on this. However coumadin has been continued for now. Renal function improving off ACEi. and CHF improved with IV lasix. Influenza B + renally adjusted tamiflu.

## 2017-04-15 LAB
INR BLD: 2.99 RATIO — HIGH (ref 0.88–1.16)
PROTHROM AB SERPL-ACNC: 33.6 SEC — HIGH (ref 9.8–12.7)

## 2017-04-15 PROCEDURE — 99233 SBSQ HOSP IP/OBS HIGH 50: CPT

## 2017-04-15 PROCEDURE — 99232 SBSQ HOSP IP/OBS MODERATE 35: CPT

## 2017-04-15 RX ORDER — ALBUTEROL 90 UG/1
2.5 AEROSOL, METERED ORAL EVERY 6 HOURS
Qty: 0 | Refills: 0 | Status: DISCONTINUED | OUTPATIENT
Start: 2017-04-15 | End: 2017-04-23

## 2017-04-15 RX ORDER — LIDOCAINE 4 G/100G
1 CREAM TOPICAL DAILY
Qty: 0 | Refills: 0 | Status: DISCONTINUED | OUTPATIENT
Start: 2017-04-15 | End: 2017-04-23

## 2017-04-15 RX ADMIN — TRAMADOL HYDROCHLORIDE 50 MILLIGRAM(S): 50 TABLET ORAL at 10:21

## 2017-04-15 RX ADMIN — TRAMADOL HYDROCHLORIDE 50 MILLIGRAM(S): 50 TABLET ORAL at 09:24

## 2017-04-15 RX ADMIN — ALBUTEROL 2.5 MILLIGRAM(S): 90 AEROSOL, METERED ORAL at 08:16

## 2017-04-15 RX ADMIN — Medication 50 MILLIGRAM(S): at 06:42

## 2017-04-15 RX ADMIN — Medication 0.6 MILLIGRAM(S): at 17:46

## 2017-04-15 RX ADMIN — CYCLOBENZAPRINE HYDROCHLORIDE 10 MILLIGRAM(S): 10 TABLET, FILM COATED ORAL at 01:41

## 2017-04-15 RX ADMIN — SODIUM CHLORIDE 3 MILLILITER(S): 9 INJECTION INTRAMUSCULAR; INTRAVENOUS; SUBCUTANEOUS at 06:44

## 2017-04-15 RX ADMIN — ATORVASTATIN CALCIUM 10 MILLIGRAM(S): 80 TABLET, FILM COATED ORAL at 22:09

## 2017-04-15 RX ADMIN — PANTOPRAZOLE SODIUM 40 MILLIGRAM(S): 20 TABLET, DELAYED RELEASE ORAL at 06:42

## 2017-04-15 RX ADMIN — CYCLOBENZAPRINE HYDROCHLORIDE 10 MILLIGRAM(S): 10 TABLET, FILM COATED ORAL at 22:10

## 2017-04-15 RX ADMIN — GABAPENTIN 300 MILLIGRAM(S): 400 CAPSULE ORAL at 06:42

## 2017-04-15 RX ADMIN — TRAMADOL HYDROCHLORIDE 50 MILLIGRAM(S): 50 TABLET ORAL at 05:00

## 2017-04-15 RX ADMIN — LIDOCAINE 1 PATCH: 4 CREAM TOPICAL at 12:13

## 2017-04-15 RX ADMIN — Medication 50 MILLIGRAM(S): at 17:46

## 2017-04-15 RX ADMIN — Medication 100 MILLIGRAM(S): at 12:13

## 2017-04-15 RX ADMIN — CYCLOBENZAPRINE HYDROCHLORIDE 10 MILLIGRAM(S): 10 TABLET, FILM COATED ORAL at 09:24

## 2017-04-15 RX ADMIN — SODIUM CHLORIDE 3 MILLILITER(S): 9 INJECTION INTRAMUSCULAR; INTRAVENOUS; SUBCUTANEOUS at 14:13

## 2017-04-15 RX ADMIN — Medication 40 MILLIGRAM(S): at 17:46

## 2017-04-15 RX ADMIN — ALBUTEROL 2.5 MILLIGRAM(S): 90 AEROSOL, METERED ORAL at 14:15

## 2017-04-15 RX ADMIN — SODIUM CHLORIDE 3 MILLILITER(S): 9 INJECTION INTRAMUSCULAR; INTRAVENOUS; SUBCUTANEOUS at 22:06

## 2017-04-15 RX ADMIN — AMLODIPINE BESYLATE 5 MILLIGRAM(S): 2.5 TABLET ORAL at 06:42

## 2017-04-15 RX ADMIN — Medication 40 MILLIGRAM(S): at 06:43

## 2017-04-15 RX ADMIN — Medication 0.6 MILLIGRAM(S): at 06:42

## 2017-04-15 RX ADMIN — Medication 30 MILLIGRAM(S): at 17:46

## 2017-04-15 RX ADMIN — Medication 30 MILLIGRAM(S): at 06:42

## 2017-04-15 RX ADMIN — ALBUTEROL 2.5 MILLIGRAM(S): 90 AEROSOL, METERED ORAL at 00:50

## 2017-04-15 RX ADMIN — GABAPENTIN 300 MILLIGRAM(S): 400 CAPSULE ORAL at 17:46

## 2017-04-15 RX ADMIN — TRAMADOL HYDROCHLORIDE 50 MILLIGRAM(S): 50 TABLET ORAL at 01:41

## 2017-04-15 NOTE — PROGRESS NOTE ADULT - PROBLEM SELECTOR PLAN 6
Patient has been on coumadin, INR is 2.99, will hold coumadin tonight and will monitor PT/INR in AM. Family advised patient likely not candidate for long term A/C due to multiple falls. per PT home Pt vs LIUDMILA. However pt uses walker and has chr lymphedema of legs and family raises concerns. given all this need to reassess A/C defer to cardiology.

## 2017-04-15 NOTE — PROGRESS NOTE ADULT - SUBJECTIVE AND OBJECTIVE BOX
CARDIOLOGY CONSULTATION NOTE   Events noted stable on telemetry overnight ; no new complaints;      REVIEW OF SYMPTOMS:   Constitutional: Denied: fever, chills, weight loss or gain  Eyes: Denied: reddened ayes, eye discharge, eye pain  ENMT: Denied: ear pain, nasal discharge, mouth pain, throat pain or swelling  Cardiovascular: Denied: chest pain,  Chest pressure  Respiratory: Denied: cough, phlegm production, wheezes, dyspnea, orthopnea, PND,   GI: Denied: Abdominal pain, nausea, vomiting, diarrhea, constipation, melena, rectal bleed  : Denied: hematuria, frequency  Musculoskeletal: Denied: Muscle aches, weakness, pain  Hematology/lymp: Denied: Bleeding disorder, anemia, blood clotting  Neuro: Denied: Headache, light headedness, dizziness, numbness, aphasia, dysarthria, seizure,  syncope, near syncope  Psych: Denied: depression, anxiety  Integumentary/skin: Denied: rash, bruises, ecchymosis, itching  Allergy/Immunology: denied environmental or drug allergies    ALL OTHER REVIEW OF SYSTEMS ARE NEGATIVE.    MEDICATIONS  (STANDING):  sodium chloride 0.9% lock flush 3milliLiter(s) IV Push every 8 hours  furosemide   Injectable 40milliGRAM(s) IV Push two times a day  gabapentin 300milliGRAM(s) Oral two times a day  allopurinol 100milliGRAM(s) Oral daily  colchicine 0.6milliGRAM(s) Oral two times a day  pantoprazole    Tablet 40milliGRAM(s) Oral before breakfast  amLODIPine   Tablet 5milliGRAM(s) Oral daily  metoprolol 50milliGRAM(s) Oral two times a day  atorvastatin 10milliGRAM(s) Oral at bedtime    MEDICATIONS  (PRN):  ondansetron Injectable 4milliGRAM(s) IV Push every 6 hours PRN Nausea  traMADol 50milliGRAM(s) Oral every 6 hours PRN Moderate Pain (4 - 6)        PAST MEDICAL & SURGICAL HISTORY:  Vasculitis determined by biopsy of skin  CKD (chronic kidney disease) stage 3, GFR 30-59 ml/min  Secondary hypertension  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, unspecified site  Acute on chronic congestive heart failure, unspecified congestive heart failure type  Lumbar disc disease  Afib  Kidney disease  Neuropathy  COPD (chronic obstructive pulmonary disease)  AAA (abdominal aortic aneurysm)  Aortic valve replaced  S/P appendectomy  S/P CABG x 1  No significant past surgical history      FAMILY HISTORY:  No pertinent family history in first degree relatives      SOCIAL HISTORY:     CIGARETTES:  No    ALCOHOL: No    DRUGS: No    Vital Signs Last 24 Hrs  T(C): 36.5, Max: 36.9 (04-12 @ 14:16)  T(F): 97.7, Max: 98.4 (04-12 @ 14:16)  HR: 60 (56 - 62)  BP: 120/62 (106/52 - 156/67)  BP(mean): 79 (79 - 79)  RR: 22 (18 - 22)  SpO2: 98% (94% - 98%)    PHYSICAL EXAM:      Constitutional: No fever, chills, NAD, Comfortable    Eyes: Not reddened, no discharge    ENMT: No discharge, No pain    Neck: No JVD, No Bruit    Back: No CVA tenderness    Respiratory: Clear to auscultation bilaterally    Cardiovascular: RRR, Normal S1-2, No S3-, No friction rub 1-2/6 SM at RUSB    Gastrointestinal: Soft, NT/ND. BS+, No organomegaly    Extremities: 2+ ankle edema bilaterally with reddened skin    Vascular: Distal pulses intact    Neurological: Alert, awake, oriented, able to move extremities, speech is clear    Skin: No ecchymosis, no rash    Musculoskeletal: Non tender,     Psychiatric: Appropriate mood, no anxiety        INTERPRETATION OF TELEMETRY: SR    ECG: SR, lateral ST - T abnormalities    I&O's Detail    I & Os for current day (as of 13 Apr 2017 09:30)  =============================================  IN:    Oral Fluid: 450 ml    Total IN: 450 ml  ---------------------------------------------  OUT:    Voided: 950 ml    Total OUT: 950 ml  ---------------------------------------------  Total NET: -500 ml      LABS:                        9.6    4.3   )-----------( 122      ( 13 Apr 2017 07:24 )             30.4     04-13    134<L>  |  98  |  47.0<H>  ----------------------------<  98  3.2<L>   |  20.0<L>  |  2.31<H>    Ca    8.3<L>      13 Apr 2017 07:24  Phos  3.5     04-13  Mg     1.9     04-13    TPro  6.7  /  Alb  2.8<L>  /  TBili  0.3<L>  /  DBili  x   /  AST  46<H>  /  ALT  29  /  AlkPhos  60  04-12    CARDIAC MARKERS ( 12 Apr 2017 16:43 )  x     / 0.02 ng/mL / 253 U/L / x     / 3.7 ng/mL      PT/INR - ( 13 Apr 2017 07:24 )   PT: 19.9 sec;   INR: 1.79 ratio         PTT - ( 12 Apr 2017 16:43 )  PTT:31.0 sec    I&O's Summary    I & Os for current day (as of 13 Apr 2017 09:30)  =============================================  IN: 450 ml / OUT: 950 ml / NET: -500 ml      RADIOLOGY & ADDITIONAL STUDIES:  X-ray:    PREVIOUS DIAGNOSTIC TESTING:     ; CARDIOLOGY follow-up NOTE   Events noted stable on telemetry overnight ; no new complaints;      REVIEW OF SYMPTOMS:   Constitutional: Denied: fever, chills, weight loss or gain  Eyes: Denied: reddened ayes, eye discharge, eye pain  ENMT: Denied: ear pain, nasal discharge, mouth pain, throat pain or swelling  Cardiovascular: Denied: chest pain,  Chest pressure  Respiratory: Denied: cough, phlegm production, wheezes, dyspnea, orthopnea, PND,   GI: Denied: Abdominal pain, nausea, vomiting, diarrhea, constipation, melena, rectal bleed  : Denied: hematuria, frequency  Musculoskeletal: Denied: Muscle aches, weakness, pain  Hematology/lymp: Denied: Bleeding disorder, anemia, blood clotting  Neuro: Denied: Headache, light headedness, dizziness, numbness, aphasia, dysarthria, seizure,  syncope, near syncope  Psych: Denied: depression, anxiety  Integumentary/skin: Denied: rash, bruises, ecchymosis, itching  Allergy/Immunology: denied environmental or drug allergies    ALL OTHER REVIEW OF SYSTEMS ARE NEGATIVE.    MEDICATIONS  (STANDING):  sodium chloride 0.9% lock flush 3milliLiter(s) IV Push every 8 hours  furosemide   Injectable 40milliGRAM(s) IV Push two times a day  gabapentin 300milliGRAM(s) Oral two times a day  allopurinol 100milliGRAM(s) Oral daily  colchicine 0.6milliGRAM(s) Oral two times a day  pantoprazole    Tablet 40milliGRAM(s) Oral before breakfast  amLODIPine   Tablet 5milliGRAM(s) Oral daily  metoprolol 50milliGRAM(s) Oral two times a day  atorvastatin 10milliGRAM(s) Oral at bedtime    MEDICATIONS  (PRN):  ondansetron Injectable 4milliGRAM(s) IV Push every 6 hours PRN Nausea  traMADol 50milliGRAM(s) Oral every 6 hours PRN Moderate Pain (4 - 6)        PAST MEDICAL & SURGICAL HISTORY:  Vasculitis determined by biopsy of skin  CKD (chronic kidney disease) stage 3, GFR 30-59 ml/min  Secondary hypertension  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, unspecified site  Acute on chronic congestive heart failure, unspecified congestive heart failure type  Lumbar disc disease  Afib  Kidney disease  Neuropathy  COPD (chronic obstructive pulmonary disease)  AAA (abdominal aortic aneurysm)  Aortic valve replaced  S/P appendectomy  S/P CABG x 1  No significant past surgical history      FAMILY HISTORY:  No pertinent family history in first degree relatives      SOCIAL HISTORY:     CIGARETTES:  No    ALCOHOL: No    DRUGS: No    Vital Signs Last 24 Hrs  T(C): 36.5, Max: 36.9 (04-12 @ 14:16)  T(F): 97.7, Max: 98.4 (04-12 @ 14:16)  HR: 60 (56 - 62)  BP: 120/62 (106/52 - 156/67)  BP(mean): 79 (79 - 79)  RR: 22 (18 - 22)  SpO2: 98% (94% - 98%)    PHYSICAL EXAM:      Constitutional: No fever, chills, NAD, Comfortable    Eyes: Not reddened, no discharge    ENMT: No discharge, No pain    Neck: No JVD, No Bruit    Back: No CVA tenderness    Respiratory: Clear to auscultation bilaterally    Cardiovascular: RRR, Normal S1-2, No S3-, No friction rub 1-2/6 SM at RUSB    Gastrointestinal: Soft, NT/ND. BS+, No organomegaly    Extremities: 2+ ankle edema bilaterally with reddened skin    Vascular: Distal pulses intact    Neurological: Alert, awake, oriented, able to move extremities, speech is clear    Skin: No ecchymosis, no rash    Musculoskeletal: Non tender,     Psychiatric: Appropriate mood, no anxiety        INTERPRETATION OF TELEMETRY: SR    ECG: SR, lateral ST - T abnormalities    I&O's Detail    I & Os for current day (as of 13 Apr 2017 09:30)  =============================================  IN:    Oral Fluid: 450 ml    Total IN: 450 ml  ---------------------------------------------  OUT:    Voided: 950 ml    Total OUT: 950 ml  ---------------------------------------------  Total NET: -500 ml      LABS:                        9.6    4.3   )-----------( 122      ( 13 Apr 2017 07:24 )             30.4     04-13    134<L>  |  98  |  47.0<H>  ----------------------------<  98  3.2<L>   |  20.0<L>  |  2.31<H>    Ca    8.3<L>      13 Apr 2017 07:24  Phos  3.5     04-13  Mg     1.9     04-13    TPro  6.7  /  Alb  2.8<L>  /  TBili  0.3<L>  /  DBili  x   /  AST  46<H>  /  ALT  29  /  AlkPhos  60  04-12    CARDIAC MARKERS ( 12 Apr 2017 16:43 )  x     / 0.02 ng/mL / 253 U/L / x     / 3.7 ng/mL      PT/INR - ( 13 Apr 2017 07:24 )   PT: 19.9 sec;   INR: 1.79 ratio         PTT - ( 12 Apr 2017 16:43 )  PTT:31.0 sec    I&O's Summary    I & Os for current day (as of 13 Apr 2017 09:30)  =============================================  IN: 450 ml / OUT: 950 ml / NET: -500 ml      RADIOLOGY & ADDITIONAL STUDIES:  X-ray:    PREVIOUS DIAGNOSTIC TESTING:     ;

## 2017-04-15 NOTE — PROGRESS NOTE ADULT - ASSESSMENT
Assessment and Plan:      76 yo M with hx Gout, COPD, HTN, HLD, CHF, s/p AVR  (bio) and  CABG x1 eight years ago,  s/p open repair of AAA,  PAF on coumadin, chronic leg edema brought in by family after increasing generalized weakness, and increasing leg edema,had falls prior to admission admitted with weakness  and fal.    1) Acute on chronic congestive heart failure, unspecified congestive heart failure type  Lasix  Metoprolol  TTE c/w normal LV systolic function LVEF 65% normal function bioprosthetic AVR    2) CKD (chronic kidney disease) stage 3, GFR 30-59 ml/min  H/o or current diagnosis of HF- ACEI/ARB contraindication unknown    3) hypertension  BP controlled  Continue afterload reduction  Monitor BP.    4) PAF, telemetry reviewed  Currently maintaining SR.  Coumadin on hold due to prohibitive fall risk  Continue Metoprolol.    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  Out-patient FU with vascular surgery is recommended

## 2017-04-15 NOTE — PROGRESS NOTE ADULT - ATTENDING COMMENTS
Back pain radiating to groin: patient is on flexeril and tramadol, pain is escalating, will give PRN percocet, topical lidoderm patch, if still not controlled, will call pain management. Back pain radiating to groin: patient is on flexeril and tramadol, pain is escalating, will give PRN percocet, topical lidoderm patch, if still not controlled, will call pain management, patient has Hx of inguinal hernia, will monitor if the pain is not well controlled will reval if the pain is due to hernia.

## 2017-04-15 NOTE — PROGRESS NOTE ADULT - SUBJECTIVE AND OBJECTIVE BOX
CINDI HALE    [id number]    77y      Male    Patient is a 77y old  Male who presents with a chief complaint of my legs collapsed (12 Apr 2017 22:43)      INTERVAL HPI/OVERNIGHT EVENTS:  No over night issues, patient has lower back pain, radiating to the groin and left thigh, he has Hx of chronic back pain, was treated with steroid injections in the past, currently his pain is escalating to 10/10, denies fever, chills, chest pain, his SOB is improving.    REVIEW OF SYSTEMS:    CONSTITUTIONAL: No fever, has fatigue  RESPIRATORY: No cough, shortness of breath is improving.   CARDIOVASCULAR: No chest pain, palpitations  GASTROINTESTINAL: No abdominal or epigastric pain. No nausea, vomiting  NEUROLOGICAL: No headaches,  loss of strength.  MISCELLANEOUS: back and groin pain        Vital Signs Last 24 Hrs  T(C): 36.5, Max: 36.7 (04-14 @ 12:13)  T(F): 97.7, Max: 98.1 (04-14 @ 12:13)  HR: 66 (65 - 78)  BP: 136/64 (124/64 - 148/58)  RR: 19 (17 - 19)  SpO2: 93% (93% - 98%)    PHYSICAL EXAM:    GENERAL: Elderly male looking uncomfortable  HEENT: PERRL, +EOMI  NECK: soft, Supple, No JVD,   CHEST/LUNG: some crackles bilaterally; No wheezing  HEART: S1S2+, Regular rate and rhythm; No murmurs  ABDOMEN: Soft, Nontender, Nondistended; Bowel sounds present  EXTREMITIES:  2+ Peripheral Pulses, +ve edema  SKIN: No rashes or lesions  NEURO: AAOX3, no focal deficits, no motor r sensory loss  PSYCH: normal mood      LABS:    04-14    137  |  100  |  41.0<H>  ----------------------------<  108  3.6   |  23.0  |  1.93<H>    Ca    8.3<L>      14 Apr 2017 07:50  Mg     1.9     04-14      PT/INR - ( 15 Apr 2017 07:11 )   PT: 33.6 sec;   INR: 2.99 ratio                 I&O's Summary    I & Os for current day (as of 15 Apr 2017 10:19)  =============================================  IN: 480 ml / OUT: 1050 ml / NET: -570 ml      MEDICATIONS  (STANDING):  sodium chloride 0.9% lock flush 3milliLiter(s) IV Push every 8 hours  furosemide   Injectable 40milliGRAM(s) IV Push two times a day  gabapentin 300milliGRAM(s) Oral two times a day  allopurinol 100milliGRAM(s) Oral daily  colchicine 0.6milliGRAM(s) Oral two times a day  pantoprazole    Tablet 40milliGRAM(s) Oral before breakfast  amLODIPine   Tablet 5milliGRAM(s) Oral daily  metoprolol 50milliGRAM(s) Oral two times a day  atorvastatin 10milliGRAM(s) Oral at bedtime  oseltamivir 30milliGRAM(s) Oral two times a day  ALBUTerol    0.083% 2.5milliGRAM(s) Nebulizer every 6 hours  lidocaine   Patch 1Patch Transdermal daily    MEDICATIONS  (PRN):  ondansetron Injectable 4milliGRAM(s) IV Push every 6 hours PRN Nausea  traMADol 50milliGRAM(s) Oral every 6 hours PRN Moderate Pain (4 - 6)  cyclobenzaprine 10milliGRAM(s) Oral three times a day PRN Muscle Spasm  oxyCODONE  5 mG/acetaminophen 325 mG 1Tablet(s) Oral every 6 hours PRN Severe Pain (7 - 10)

## 2017-04-16 LAB
ANION GAP SERPL CALC-SCNC: 12 MMOL/L — SIGNIFICANT CHANGE UP (ref 5–17)
BUN SERPL-MCNC: 30 MG/DL — HIGH (ref 8–20)
C DIFF BY PCR RESULT: SIGNIFICANT CHANGE UP
C DIFF TOX GENS STL QL NAA+PROBE: SIGNIFICANT CHANGE UP
CALCIUM SERPL-MCNC: 8.3 MG/DL — LOW (ref 8.6–10.2)
CHLORIDE SERPL-SCNC: 104 MMOL/L — SIGNIFICANT CHANGE UP (ref 98–107)
CO2 SERPL-SCNC: 24 MMOL/L — SIGNIFICANT CHANGE UP (ref 22–29)
CREAT SERPL-MCNC: 1.52 MG/DL — HIGH (ref 0.5–1.3)
GLUCOSE SERPL-MCNC: 109 MG/DL — SIGNIFICANT CHANGE UP (ref 70–115)
HCT VFR BLD CALC: 30.1 % — LOW (ref 42–52)
HGB BLD-MCNC: 9.6 G/DL — LOW (ref 14–18)
INR BLD: 5.02 RATIO — CRITICAL HIGH (ref 0.88–1.16)
MAGNESIUM SERPL-MCNC: 1.8 MG/DL — SIGNIFICANT CHANGE UP (ref 1.8–2.5)
MCHC RBC-ENTMCNC: 23.9 PG — LOW (ref 27–31)
MCHC RBC-ENTMCNC: 31.9 G/DL — LOW (ref 32–36)
MCV RBC AUTO: 75.1 FL — LOW (ref 80–94)
PLATELET # BLD AUTO: 193 K/UL — SIGNIFICANT CHANGE UP (ref 150–400)
POTASSIUM SERPL-MCNC: 3.6 MMOL/L — SIGNIFICANT CHANGE UP (ref 3.5–5.3)
POTASSIUM SERPL-SCNC: 3.6 MMOL/L — SIGNIFICANT CHANGE UP (ref 3.5–5.3)
PROTHROM AB SERPL-ACNC: 57.1 SEC — HIGH (ref 9.8–12.7)
RBC # BLD: 4.01 M/UL — LOW (ref 4.6–6.2)
RBC # FLD: 19.5 % — HIGH (ref 11–15.6)
SODIUM SERPL-SCNC: 140 MMOL/L — SIGNIFICANT CHANGE UP (ref 135–145)
WBC # BLD: 6.3 K/UL — SIGNIFICANT CHANGE UP (ref 4.8–10.8)
WBC # FLD AUTO: 6.3 K/UL — SIGNIFICANT CHANGE UP (ref 4.8–10.8)

## 2017-04-16 PROCEDURE — 99232 SBSQ HOSP IP/OBS MODERATE 35: CPT

## 2017-04-16 PROCEDURE — 99233 SBSQ HOSP IP/OBS HIGH 50: CPT

## 2017-04-16 PROCEDURE — 99231 SBSQ HOSP IP/OBS SF/LOW 25: CPT

## 2017-04-16 RX ORDER — PHYTONADIONE (VIT K1) 5 MG
2.5 TABLET ORAL ONCE
Qty: 0 | Refills: 0 | Status: COMPLETED | OUTPATIENT
Start: 2017-04-16 | End: 2017-04-16

## 2017-04-16 RX ADMIN — Medication 30 MILLIGRAM(S): at 06:44

## 2017-04-16 RX ADMIN — Medication 50 MILLIGRAM(S): at 06:44

## 2017-04-16 RX ADMIN — Medication 50 MILLIGRAM(S): at 18:59

## 2017-04-16 RX ADMIN — ALBUTEROL 2.5 MILLIGRAM(S): 90 AEROSOL, METERED ORAL at 20:22

## 2017-04-16 RX ADMIN — Medication 0.6 MILLIGRAM(S): at 18:59

## 2017-04-16 RX ADMIN — GABAPENTIN 300 MILLIGRAM(S): 400 CAPSULE ORAL at 06:43

## 2017-04-16 RX ADMIN — ALBUTEROL 2.5 MILLIGRAM(S): 90 AEROSOL, METERED ORAL at 03:24

## 2017-04-16 RX ADMIN — ALBUTEROL 2.5 MILLIGRAM(S): 90 AEROSOL, METERED ORAL at 09:03

## 2017-04-16 RX ADMIN — Medication 30 MILLIGRAM(S): at 18:58

## 2017-04-16 RX ADMIN — LIDOCAINE 1 PATCH: 4 CREAM TOPICAL at 00:56

## 2017-04-16 RX ADMIN — Medication 0.6 MILLIGRAM(S): at 06:44

## 2017-04-16 RX ADMIN — SODIUM CHLORIDE 3 MILLILITER(S): 9 INJECTION INTRAMUSCULAR; INTRAVENOUS; SUBCUTANEOUS at 22:04

## 2017-04-16 RX ADMIN — TRAMADOL HYDROCHLORIDE 50 MILLIGRAM(S): 50 TABLET ORAL at 22:04

## 2017-04-16 RX ADMIN — Medication 40 MILLIGRAM(S): at 06:45

## 2017-04-16 RX ADMIN — ATORVASTATIN CALCIUM 10 MILLIGRAM(S): 80 TABLET, FILM COATED ORAL at 22:04

## 2017-04-16 RX ADMIN — AMLODIPINE BESYLATE 5 MILLIGRAM(S): 2.5 TABLET ORAL at 06:44

## 2017-04-16 RX ADMIN — GABAPENTIN 300 MILLIGRAM(S): 400 CAPSULE ORAL at 18:58

## 2017-04-16 RX ADMIN — Medication 2.5 MILLIGRAM(S): at 18:58

## 2017-04-16 RX ADMIN — SODIUM CHLORIDE 3 MILLILITER(S): 9 INJECTION INTRAMUSCULAR; INTRAVENOUS; SUBCUTANEOUS at 14:43

## 2017-04-16 RX ADMIN — Medication 100 MILLIGRAM(S): at 12:13

## 2017-04-16 RX ADMIN — SODIUM CHLORIDE 3 MILLILITER(S): 9 INJECTION INTRAMUSCULAR; INTRAVENOUS; SUBCUTANEOUS at 06:47

## 2017-04-16 RX ADMIN — Medication 40 MILLIGRAM(S): at 18:59

## 2017-04-16 RX ADMIN — LIDOCAINE 1 PATCH: 4 CREAM TOPICAL at 12:14

## 2017-04-16 RX ADMIN — ALBUTEROL 2.5 MILLIGRAM(S): 90 AEROSOL, METERED ORAL at 15:06

## 2017-04-16 RX ADMIN — PANTOPRAZOLE SODIUM 40 MILLIGRAM(S): 20 TABLET, DELAYED RELEASE ORAL at 06:44

## 2017-04-16 NOTE — PROGRESS NOTE ADULT - SUBJECTIVE AND OBJECTIVE BOX
Renal :    140    |  104    |  30.0<H>  ----------------------------<  109    Ca:8.3<L> (2017 06:39)  3.6     |  24.0   |  1.52<H>      eGFR if Non : 43 <L>  eGFR if : 50 <L>                            9.6<L>  6.3   )-----------( 193      ( 2017 06:39 )             30.1<L>    Phos:-- M.8 mg/dL PTH:-- Uric acid:-- Serum Osm:--  Ferritin:-- Iron:-- TIBC:-- Tsat:--  B12:-- TSH:-- ( @ 06:39)        Dx : CKD (chronic kidney disease) stage 3, GFR 30-59 ml/min.,    H/o or current diagnosis of HF-     ACEi  /ARB contraindication unknown,      Will maintain F.U,

## 2017-04-16 NOTE — PROGRESS NOTE ADULT - ASSESSMENT
Assessment and Plan:      76 yo M with hx Gout, COPD, HTN, HLD, CHF, s/p AVR  (bio) and  CABG x1 eight years ago,  s/p open repair of AAA,  PAF on coumadin, chronic leg edema brought in by family after increasing generalized weakness, and increasing leg edema,had falls prior to admission admitted with weakness.    1) Acute on chronic congestive heart failure, unspecified congestive heart failure type  Lasix  Metoprolol  TTE c/w normal LV systolic function LVEF 65% normal function bioprosthetic AVR    2) CKD (chronic kidney disease) stage 3, GFR 30-59 ml/min  H/o or current diagnosis of HF- ACEI/ARB contraindication unknown    3) hypertension  BP controlled  Continue afterload reduction  Monitor BP.    4) PAF, telemetry reviewed  Currently maintaining SR.  Coumadin on hold due to prohibitive fall risk  Continue Metoprolol.    50 Hypokalemia : Replete K as indicated

## 2017-04-16 NOTE — PROGRESS NOTE ADULT - PROBLEM SELECTOR PLAN 6
Patient has been on coumadin, INR is 5, talked with patient and family today again, patient has frequent fall, will d/c heparin drip, will reverse INR, will monitor.  will discuss with cardiology again.

## 2017-04-16 NOTE — PROGRESS NOTE ADULT - ATTENDING COMMENTS
Back pain radiating to groin: pain is better, patient is on flexeril and tramadol, PRN percocet, topical lidoderm patch, patient has Hx of inguinal hernia, will monitor if the pain is not well controlled will reval if the pain is due to hernia, but pain got better with Lidoderm patch.     Per PT home PT vs [facility]. However pt uses walker and has chr lymphedema of legs and family raises concerns.

## 2017-04-16 NOTE — PROGRESS NOTE ADULT - ASSESSMENT
78 y/o male with acute on chronic CHF of unclear etiology, Hx of Afib, HLD, HTN, Neuropathy, AVR, COPD, CKD-3, CAD s/p CABG, Gout, Chronic back pain admitted with acute chf with preserved EF. no sys or diastolic dysfunction on ECHO. was on coumadin, but family feels he should not be on it due to risk of fall, talked with patient and wife again they would like to stop coumadin, will d/c coumadin, Renal function improving off ACEi. and CHF improved with IV lasix. Influenza B + renally adjusted tamiflu.

## 2017-04-16 NOTE — PROGRESS NOTE ADULT - SUBJECTIVE AND OBJECTIVE BOX
CINDI HALE    [id number]    77y      Male    Patient is a 77y old  Male who presents with a chief complaint of my legs collapsed (12 Apr 2017 22:43)      INTERVAL HPI/OVERNIGHT EVENTS:  No over night issues, Patient's back pain is better with Lidoderm patch, he has Hx of chronic back pain, was treated with steroid injections in the past, denies fever, chills, chest pain, his SOB is improving.    REVIEW OF SYSTEMS:    CONSTITUTIONAL: No fever, has fatigue  RESPIRATORY: No cough, shortness of breath is improving.   CARDIOVASCULAR: No chest pain, palpitations  GASTROINTESTINAL: No abdominal or epigastric pain. No nausea, vomiting  NEUROLOGICAL: No headaches,  loss of strength.  MISCELLANEOUS: back and groin pain        Vital Signs Last 24 Hrs  T(C): 36.5, Max: 36.7 (04-14 @ 12:13)  T(F): 97.7, Max: 98.1 (04-14 @ 12:13)  HR: 66 (65 - 78)  BP: 136/64 (124/64 - 148/58)  RR: 19 (17 - 19)  SpO2: 93% (93% - 98%)    PHYSICAL EXAM:    GENERAL: Elderly male looking comfortable  HEENT: PERRL, +EOMI  NECK: soft, Supple, No JVD,   CHEST/LUNG: some crackles bilaterally; No wheezing  HEART: S1S2+, Regular rate and rhythm; No murmurs  ABDOMEN: Soft, Nontender, Nondistended; Bowel sounds present  EXTREMITIES:  2+ Peripheral Pulses, +ve edema  SKIN: No rashes or lesions  NEURO: AAOX3, no focal deficits, no motor r sensory loss  PSYCH: normal mood      LABS:    04-14    137  |  100  |  41.0<H>  ----------------------------<  108  3.6   |  23.0  |  1.93<H>    Ca    8.3<L>      14 Apr 2017 07:50  Mg     1.9     04-14      PT/INR - ( 15 Apr 2017 07:11 )   PT: 33.6 sec;   INR: 2.99 ratio                 I&O's Summary    I & Os for current day (as of 15 Apr 2017 10:19)  =============================================  IN: 480 ml / OUT: 1050 ml / NET: -570 ml      MEDICATIONS  (STANDING):  sodium chloride 0.9% lock flush 3milliLiter(s) IV Push every 8 hours  furosemide   Injectable 40milliGRAM(s) IV Push two times a day  gabapentin 300milliGRAM(s) Oral two times a day  allopurinol 100milliGRAM(s) Oral daily  colchicine 0.6milliGRAM(s) Oral two times a day  pantoprazole    Tablet 40milliGRAM(s) Oral before breakfast  amLODIPine   Tablet 5milliGRAM(s) Oral daily  metoprolol 50milliGRAM(s) Oral two times a day  atorvastatin 10milliGRAM(s) Oral at bedtime  oseltamivir 30milliGRAM(s) Oral two times a day  ALBUTerol    0.083% 2.5milliGRAM(s) Nebulizer every 6 hours  lidocaine   Patch 1Patch Transdermal daily    MEDICATIONS  (PRN):  ondansetron Injectable 4milliGRAM(s) IV Push every 6 hours PRN Nausea  traMADol 50milliGRAM(s) Oral every 6 hours PRN Moderate Pain (4 - 6)  cyclobenzaprine 10milliGRAM(s) Oral three times a day PRN Muscle Spasm  oxyCODONE  5 mG/acetaminophen 325 mG 1Tablet(s) Oral every 6 hours PRN Severe Pain (7 - 10) CINDI HALE    [id number]    77y      Male    Patient is a 77y old  Male who presents with a chief complaint of my legs collapsed (12 Apr 2017 22:43)      INTERVAL HPI/OVERNIGHT EVENTS:  No over night issues, Patient's back pain is better with Lidoderm patch, he has Hx of chronic back pain, was treated with steroid injections in the past, denies fever, chills, chest pain, his SOB is improving.    REVIEW OF SYSTEMS:    CONSTITUTIONAL: No fever, has fatigue  RESPIRATORY: No cough, shortness of breath is improving.   CARDIOVASCULAR: No chest pain, palpitations  GASTROINTESTINAL: No abdominal or epigastric pain. No nausea, vomiting  NEUROLOGICAL: No headaches,  loss of strength.  MISCELLANEOUS: back and groin pain        Vital Signs Last 24 Hrs  T(C): 37.8, Max: 36.7 (04-14 @ 12:13)  T(F): 97.7, Max: 98.1 (04-14 @ 12:13)  HR: 71 (65 - 78)  BP: 128/70 (124/64 - 148/58)  RR: 18 (17 - 19)  SpO2: 95% (93% - 98%)    PHYSICAL EXAM:    GENERAL: Elderly male looking comfortable  HEENT: PERRL, +EOMI  NECK: soft, Supple, No JVD,   CHEST/LUNG: some crackles bilaterally; No wheezing  HEART: S1S2+, Regular rate and rhythm; No murmurs  ABDOMEN: Soft, Nontender, Nondistended; Bowel sounds present  EXTREMITIES:  2+ Peripheral Pulses, +ve edema  SKIN: No rashes or lesions  NEURO: AAOX3, no focal deficits, no motor r sensory loss  PSYCH: normal mood      LABS:    04-14    137  |  100  |  41.0<H>  ----------------------------<  108  3.6   |  23.0  |  1.93<H>    Ca    8.3<L>      14 Apr 2017 07:50  Mg     1.9     04-14      PT/INR - ( 15 Apr 2017 07:11 )   PT: 33.6 sec;   INR: 2.99 ratio                 I&O's Summary    I & Os for current day (as of 15 Apr 2017 10:19)  =============================================  IN: 480 ml / OUT: 1050 ml / NET: -570 ml      MEDICATIONS  (STANDING):  sodium chloride 0.9% lock flush 3milliLiter(s) IV Push every 8 hours  furosemide   Injectable 40milliGRAM(s) IV Push two times a day  gabapentin 300milliGRAM(s) Oral two times a day  allopurinol 100milliGRAM(s) Oral daily  colchicine 0.6milliGRAM(s) Oral two times a day  pantoprazole    Tablet 40milliGRAM(s) Oral before breakfast  amLODIPine   Tablet 5milliGRAM(s) Oral daily  metoprolol 50milliGRAM(s) Oral two times a day  atorvastatin 10milliGRAM(s) Oral at bedtime  oseltamivir 30milliGRAM(s) Oral two times a day  ALBUTerol    0.083% 2.5milliGRAM(s) Nebulizer every 6 hours  lidocaine   Patch 1Patch Transdermal daily    MEDICATIONS  (PRN):  ondansetron Injectable 4milliGRAM(s) IV Push every 6 hours PRN Nausea  traMADol 50milliGRAM(s) Oral every 6 hours PRN Moderate Pain (4 - 6)  cyclobenzaprine 10milliGRAM(s) Oral three times a day PRN Muscle Spasm  oxyCODONE  5 mG/acetaminophen 325 mG 1Tablet(s) Oral every 6 hours PRN Severe Pain (7 - 10)

## 2017-04-16 NOTE — PROGRESS NOTE ADULT - SUBJECTIVE AND OBJECTIVE BOX
Events noted: NO new complaints stable on telemetry.      MEDICATIONS  (STANDING):  sodium chloride 0.9% lock flush 3milliLiter(s) IV Push every 8 hours  furosemide   Injectable 40milliGRAM(s) IV Push two times a day  gabapentin 300milliGRAM(s) Oral two times a day  allopurinol 100milliGRAM(s) Oral daily  colchicine 0.6milliGRAM(s) Oral two times a day  pantoprazole    Tablet 40milliGRAM(s) Oral before breakfast  amLODIPine   Tablet 5milliGRAM(s) Oral daily  metoprolol 50milliGRAM(s) Oral two times a day  atorvastatin 10milliGRAM(s) Oral at bedtime  oseltamivir 30milliGRAM(s) Oral two times a day  ALBUTerol    0.083% 2.5milliGRAM(s) Nebulizer every 6 hours  lidocaine   Patch 1Patch Transdermal daily    MEDICATIONS  (PRN):  ondansetron Injectable 4milliGRAM(s) IV Push every 6 hours PRN Nausea  traMADol 50milliGRAM(s) Oral every 6 hours PRN Moderate Pain (4 - 6)  cyclobenzaprine 10milliGRAM(s) Oral three times a day PRN Muscle Spasm  oxyCODONE  5 mG/acetaminophen 325 mG 1Tablet(s) Oral every 6 hours PRN Severe Pain (7 - 10)      Allergies    penicillins (Rash)    Intolerances      PAST MEDICAL & SURGICAL HISTORY:  Vasculitis determined by biopsy of skin  CKD (chronic kidney disease) stage 3, GFR 30-59 ml/min  Secondary hypertension  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, unspecified site  Acute on chronic congestive heart failure, unspecified congestive heart failure type  Lumbar disc disease  Afib  Kidney disease  Neuropathy  COPD (chronic obstructive pulmonary disease)  AAA (abdominal aortic aneurysm)  Aortic valve replaced  S/P appendectomy  S/P CABG x 1  No significant past surgical history      Vital Signs Last 24 Hrs  T(C): 36.6, Max: 36.8 (04-16 @ 06:40)  T(F): 97.8, Max: 98.2 (04-16 @ 06:40)  HR: 71 (53 - 71)  BP: 128/70 (128/70 - 148/68)  BP(mean): --  RR: 18 (17 - 18)  SpO2: 95% (95% - 99%)    Physical Exam:  Constitutional: NAD, AAOx3  Cardiovascular: +S1S2 RRR  Pulmonary: CTA b/l, unlabored  Abd: soft NTND +BS  Groins: C/D/I bilaterally; no bleeding, hematoma, edema  Extremities: no pedal edema, +distal pulses b/l  Neuro: non focal, HARDIN x4    LABS:                        9.6    6.3   )-----------( 193      ( 16 Apr 2017 06:39 )             30.1     04-16    140  |  104  |  30.0<H>  ----------------------------<  109  3.6   |  24.0  |  1.52<H>    Ca    8.3<L>      16 Apr 2017 06:39  Mg     1.8     04-16

## 2017-04-17 LAB
ANION GAP SERPL CALC-SCNC: 14 MMOL/L — SIGNIFICANT CHANGE UP (ref 5–17)
BUN SERPL-MCNC: 27 MG/DL — HIGH (ref 8–20)
CALCIUM SERPL-MCNC: 8.8 MG/DL — SIGNIFICANT CHANGE UP (ref 8.6–10.2)
CHLORIDE SERPL-SCNC: 101 MMOL/L — SIGNIFICANT CHANGE UP (ref 98–107)
CO2 SERPL-SCNC: 25 MMOL/L — SIGNIFICANT CHANGE UP (ref 22–29)
CREAT SERPL-MCNC: 1.56 MG/DL — HIGH (ref 0.5–1.3)
GLUCOSE SERPL-MCNC: 111 MG/DL — SIGNIFICANT CHANGE UP (ref 70–115)
HCT VFR BLD CALC: 32.2 % — LOW (ref 42–52)
HGB BLD-MCNC: 10 G/DL — LOW (ref 14–18)
INR BLD: 3.08 RATIO — HIGH (ref 0.88–1.16)
MCHC RBC-ENTMCNC: 23.5 PG — LOW (ref 27–31)
MCHC RBC-ENTMCNC: 31.1 G/DL — LOW (ref 32–36)
MCV RBC AUTO: 75.6 FL — LOW (ref 80–94)
PLATELET # BLD AUTO: 221 K/UL — SIGNIFICANT CHANGE UP (ref 150–400)
POTASSIUM SERPL-MCNC: 4 MMOL/L — SIGNIFICANT CHANGE UP (ref 3.5–5.3)
POTASSIUM SERPL-SCNC: 4 MMOL/L — SIGNIFICANT CHANGE UP (ref 3.5–5.3)
PROTHROM AB SERPL-ACNC: 34.7 SEC — HIGH (ref 9.8–12.7)
RBC # BLD: 4.26 M/UL — LOW (ref 4.6–6.2)
RBC # FLD: 19.1 % — HIGH (ref 11–15.6)
SODIUM SERPL-SCNC: 140 MMOL/L — SIGNIFICANT CHANGE UP (ref 135–145)
WBC # BLD: 8 K/UL — SIGNIFICANT CHANGE UP (ref 4.8–10.8)
WBC # FLD AUTO: 8 K/UL — SIGNIFICANT CHANGE UP (ref 4.8–10.8)

## 2017-04-17 PROCEDURE — 99232 SBSQ HOSP IP/OBS MODERATE 35: CPT

## 2017-04-17 RX ORDER — MORPHINE SULFATE 50 MG/1
3 CAPSULE, EXTENDED RELEASE ORAL EVERY 6 HOURS
Qty: 0 | Refills: 0 | Status: DISCONTINUED | OUTPATIENT
Start: 2017-04-17 | End: 2017-04-22

## 2017-04-17 RX ADMIN — Medication 50 MILLIGRAM(S): at 17:40

## 2017-04-17 RX ADMIN — GABAPENTIN 300 MILLIGRAM(S): 400 CAPSULE ORAL at 05:06

## 2017-04-17 RX ADMIN — LIDOCAINE 1 PATCH: 4 CREAM TOPICAL at 12:39

## 2017-04-17 RX ADMIN — TRAMADOL HYDROCHLORIDE 50 MILLIGRAM(S): 50 TABLET ORAL at 11:13

## 2017-04-17 RX ADMIN — Medication 30 MILLIGRAM(S): at 17:40

## 2017-04-17 RX ADMIN — Medication 40 MILLIGRAM(S): at 05:08

## 2017-04-17 RX ADMIN — GABAPENTIN 300 MILLIGRAM(S): 400 CAPSULE ORAL at 17:40

## 2017-04-17 RX ADMIN — TRAMADOL HYDROCHLORIDE 50 MILLIGRAM(S): 50 TABLET ORAL at 10:13

## 2017-04-17 RX ADMIN — Medication 100 MILLIGRAM(S): at 23:37

## 2017-04-17 RX ADMIN — ATORVASTATIN CALCIUM 10 MILLIGRAM(S): 80 TABLET, FILM COATED ORAL at 21:53

## 2017-04-17 RX ADMIN — Medication 30 MILLIGRAM(S): at 05:08

## 2017-04-17 RX ADMIN — Medication 40 MILLIGRAM(S): at 17:40

## 2017-04-17 RX ADMIN — Medication 0.6 MILLIGRAM(S): at 17:40

## 2017-04-17 RX ADMIN — AMLODIPINE BESYLATE 5 MILLIGRAM(S): 2.5 TABLET ORAL at 05:08

## 2017-04-17 RX ADMIN — ALBUTEROL 2.5 MILLIGRAM(S): 90 AEROSOL, METERED ORAL at 03:19

## 2017-04-17 RX ADMIN — ALBUTEROL 2.5 MILLIGRAM(S): 90 AEROSOL, METERED ORAL at 16:06

## 2017-04-17 RX ADMIN — SODIUM CHLORIDE 3 MILLILITER(S): 9 INJECTION INTRAMUSCULAR; INTRAVENOUS; SUBCUTANEOUS at 21:46

## 2017-04-17 RX ADMIN — ALBUTEROL 2.5 MILLIGRAM(S): 90 AEROSOL, METERED ORAL at 09:30

## 2017-04-17 RX ADMIN — Medication 100 MILLIGRAM(S): at 20:18

## 2017-04-17 RX ADMIN — SODIUM CHLORIDE 3 MILLILITER(S): 9 INJECTION INTRAMUSCULAR; INTRAVENOUS; SUBCUTANEOUS at 14:10

## 2017-04-17 RX ADMIN — PANTOPRAZOLE SODIUM 40 MILLIGRAM(S): 20 TABLET, DELAYED RELEASE ORAL at 05:08

## 2017-04-17 RX ADMIN — Medication 100 MILLIGRAM(S): at 14:53

## 2017-04-17 RX ADMIN — Medication 100 MILLIGRAM(S): at 12:39

## 2017-04-17 RX ADMIN — Medication 0.6 MILLIGRAM(S): at 05:06

## 2017-04-17 RX ADMIN — Medication 50 MILLIGRAM(S): at 05:08

## 2017-04-17 RX ADMIN — SODIUM CHLORIDE 3 MILLILITER(S): 9 INJECTION INTRAMUSCULAR; INTRAVENOUS; SUBCUTANEOUS at 08:05

## 2017-04-17 RX ADMIN — CYCLOBENZAPRINE HYDROCHLORIDE 10 MILLIGRAM(S): 10 TABLET, FILM COATED ORAL at 12:49

## 2017-04-17 RX ADMIN — ALBUTEROL 2.5 MILLIGRAM(S): 90 AEROSOL, METERED ORAL at 20:42

## 2017-04-17 NOTE — PROGRESS NOTE ADULT - ASSESSMENT
76 y/o male with acute on chronic CHF of unclear etiology, Hx of Afib, HLD, HTN, Neuropathy, AVR, COPD, CKD-3, CAD s/p CABG, Gout, Chronic back pain admitted with acute chf with preserved EF. no sys or diastolic dysfunction on ECHO. was on coumadin, but family feels he should not be on it due to risk of fall, talked with patient and wife again they would like to stop coumadin, will d/c coumadin, Renal function improving off ACEi. and CHF improved with IV lasix. Influenza B + renally adjusted tamiflu.

## 2017-04-17 NOTE — PROGRESS NOTE ADULT - PROBLEM SELECTOR PLAN 6
Talked with patient and family today again, patient has frequent fall, no more coumadin, will monitor, will discuss with cardiology again.

## 2017-04-17 NOTE — PROGRESS NOTE ADULT - ATTENDING COMMENTS
Back pain radiating to groin: patient is on flexeril and tramadol, PRN percocet, topical lidoderm patch, patient has Hx of inguinal hernia, will monitor if the pain is not well controlled will reval if the pain is due to hernia, patient still having pain, will get pain management team.   Per PT home PT vs [facility]. However pt uses walker and has chr lymphedema of legs and family raises concerns.

## 2017-04-17 NOTE — CONSULT NOTE ADULT - PROBLEM SELECTOR RECOMMENDATION 9
1- Work up in progress  2-Pain Control: Meds-  -Continue: Percocet 5/325mg 1-2tabs, mod-severe pain                  Morphine 3mg/IVP-btp                 Gabapentin 300mg/bid                 Flexeril 10mg/q8h                 Lidoderm Patch  3-Patient agrees with above plan, questions answered to his satisfaction   4-Will continue to follow, call with any pain issue  5-Thank your for this kind consult

## 2017-04-17 NOTE — PROGRESS NOTE ADULT - SUBJECTIVE AND OBJECTIVE BOX
CINDI HALE    [id number]    77y      Male    Patient is a 77y old  Male who presents with a chief complaint of my legs collapsed (12 Apr 2017 22:43)      INTERVAL HPI/OVERNIGHT EVENTS:    Patient started having pain last night, she got percocet and Lidoderm patch, he has Hx of chronic back pain, was treated with steroid injections in the past, denies fever, chills, chest pain, his SOB is improving along with his edema.     REVIEW OF SYSTEMS:    CONSTITUTIONAL: No fever, has fatigue  RESPIRATORY: No cough, shortness of breath is improving.   CARDIOVASCULAR: No chest pain, palpitations  GASTROINTESTINAL: No abdominal or epigastric pain. No nausea, vomiting  NEUROLOGICAL: No headaches,  loss of strength.  MISCELLANEOUS: pain in the back and groin pain      Vital Signs Last 24 Hrs  T(C): 36.6, Max: 36.9 (04-17 @ 12:54)  T(F): 97.8, Max: 98.5 (04-17 @ 12:54)  HR: 75 (74 - 79)  BP: 136/62 (132/60 - 142/66)  BP(mean): --  RR: 16 (16 - 16)  SpO2: 97% (96% - 98%)    PHYSICAL EXAM:    GENERAL: Elderly male looking anxious about pain.   HEENT: PERRL, +EOMI  NECK: soft, Supple, No JVD,   CHEST/LUNG: Miminal crackles bilaterally; No wheezing  HEART: S1S2+, Regular rate and rhythm; No murmurs  ABDOMEN: Soft, Nontender, Nondistended; Bowel sounds present  EXTREMITIES:  2+ Peripheral Pulses, +ve edema  SKIN: No rashes or lesions  NEURO: AAOX3, no focal deficits, no motor r sensory loss  PSYCH: anxious mood        LABS:                        10.0   8.0   )-----------( 221      ( 17 Apr 2017 06:43 )             32.2     04-17    140  |  101  |  27.0<H>  ----------------------------<  111  4.0   |  25.0  |  1.56<H>    Ca    8.8      17 Apr 2017 06:43  Mg     1.8     04-16      PT/INR - ( 17 Apr 2017 06:43 )   PT: 34.7 sec;   INR: 3.08 ratio          I&O's Summary  I & Os for 24h ending 17 Apr 2017 07:00  =============================================  IN: 120 ml / OUT: 450 ml / NET: -330 ml    I & Os for current day (as of 17 Apr 2017 18:57)  =============================================  IN: 700 ml / OUT: 465 ml / NET: 235 ml      MEDICATIONS  (STANDING):  sodium chloride 0.9% lock flush 3milliLiter(s) IV Push every 8 hours  furosemide   Injectable 40milliGRAM(s) IV Push two times a day  gabapentin 300milliGRAM(s) Oral two times a day  allopurinol 100milliGRAM(s) Oral daily  colchicine 0.6milliGRAM(s) Oral two times a day  pantoprazole    Tablet 40milliGRAM(s) Oral before breakfast  amLODIPine   Tablet 5milliGRAM(s) Oral daily  metoprolol 50milliGRAM(s) Oral two times a day  atorvastatin 10milliGRAM(s) Oral at bedtime  oseltamivir 30milliGRAM(s) Oral two times a day  ALBUTerol    0.083% 2.5milliGRAM(s) Nebulizer every 6 hours  lidocaine   Patch 1Patch Transdermal daily  guaiFENesin   Syrup  (Sugar-Free) 100milliGRAM(s) Oral every 6 hours    MEDICATIONS  (PRN):  ondansetron Injectable 4milliGRAM(s) IV Push every 6 hours PRN Nausea  cyclobenzaprine 10milliGRAM(s) Oral three times a day PRN Muscle Spasm  morphine  - Injectable 3milliGRAM(s) IV Push every 6 hours PRN BREAKTHROUGH PAIN ONLY, IF pain persists at least one hour after oral medication  oxyCODONE  5 mG/acetaminophen 325 mG 1Tablet(s) Oral every 6 hours PRN Moderate Pain (4 - 6)  oxyCODONE  5 mG/acetaminophen 325 mG 2Tablet(s) Oral every 6 hours PRN Severe Pain (7 - 10)

## 2017-04-17 NOTE — CONSULT NOTE ADULT - SUBJECTIVE AND OBJECTIVE BOX
Chief Complaint:    HPI:  76 y/o male with history of CHF with unknown type currently, AVR with tisse valve, CAD s/p CABG, HTN, HLD, Gout, chronic LE edema, chronic back pain, AAA s/p open repair, Peripheral neuropathy, COPD not on home 02, chronic afib on coumadin brought in by family after increasing generalized weakness, and increasing leg edema, orthopnea, and falls earlier today. No reported head trauma, LOC. No focal weakness. Patient also with poor appetite over last few week. No reported bleeding, N/V/D. In the ED patient with evidence of decompensated CHF. Family also inquiring about LIUDMILA as they are aware currently patient is requiring increased care at home which can not safely be given by wife. (12 Apr 2017 21:07)      PAST MEDICAL & SURGICAL HISTORY:  Vasculitis determined by biopsy of skin  CKD (chronic kidney disease) stage 3, GFR 30-59 ml/min  Secondary hypertension  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, unspecified site  Acute on chronic congestive heart failure, unspecified congestive heart failure type  Lumbar disc disease  Afib  Kidney disease  Neuropathy  COPD (chronic obstructive pulmonary disease)  AAA (abdominal aortic aneurysm)  Aortic valve replaced  S/P appendectomy  S/P CABG x 1  No significant past surgical history      FAMILY HISTORY:  No pertinent family history in first degree relatives      SOCIAL HISTORY:  [ ] Denies Smoking, Alcohol, or Drug Use    Allergies    penicillins (Rash)    Intolerances        PAIN MEDICATIONS:  ondansetron Injectable 4milliGRAM(s) IV Push every 6 hours PRN  gabapentin 300milliGRAM(s) Oral two times a day  traMADol 50milliGRAM(s) Oral every 6 hours PRN  cyclobenzaprine 10milliGRAM(s) Oral three times a day PRN  oxyCODONE  5 mG/acetaminophen 325 mG 1Tablet(s) Oral every 6 hours PRN    Heme:    Antibiotics:  oseltamivir 30milliGRAM(s) Oral two times a day    Cardiovascular:  furosemide   Injectable 40milliGRAM(s) IV Push two times a day  amLODIPine   Tablet 5milliGRAM(s) Oral daily  metoprolol 50milliGRAM(s) Oral two times a day    GI:  pantoprazole    Tablet 40milliGRAM(s) Oral before breakfast    Endocrine:  allopurinol 100milliGRAM(s) Oral daily  colchicine 0.6milliGRAM(s) Oral two times a day  atorvastatin 10milliGRAM(s) Oral at bedtime    All Other Medications:  lidocaine   Patch 1Patch Transdermal daily      REVIEW OF SYSTEMS:    CONSTITUTIONAL: No fever, weight loss, or fatigue  EYES: No eye pain, visual disturbances, or discharge  ENMT:  No difficulty hearing, tinnitus, vertigo; No sinus or throat pain  NECK: No pain or stiffness  BREASTS: No pain, masses, or nipple discharge  RESPIRATORY: No cough, wheezing, chills or hemoptysis; No shortness of breath  CARDIOVASCULAR: No chest pain, palpitations, dizziness, or leg swelling  GASTROINTESTINAL: No abdominal or epigastric pain. No nausea, vomiting, or hematemesis; No diarrhea or constipation. No melena or hematochezia.  GENITOURINARY: No dysuria, frequency, hematuria, or incontinence  NEUROLOGICAL: No headaches, memory loss, loss of strength, numbness, or tremors  SKIN: No itching, burning, rashes, or lesions   LYMPH NODES: No enlarged glands  ENDOCRINE: No heat or cold intolerance; No hair loss  MUSCULOSKELETAL: No joint pain or swelling; No muscle, back, or extremity pain  PSYCHIATRIC: No depression, anxiety, mood swings, or difficulty sleeping  HEME/LYMPH: No easy bruising, or bleeding gums  ALLERY AND IMMUNOLOGIC: No hives or eczema      Vital Signs Last 24 Hrs  T(C): 36.7, Max: 36.7 (04-17 @ 05:04)  T(F): 98, Max: 98 (04-17 @ 05:04)  HR: 79 (71 - 79)  BP: 138/62 (128/70 - 140/54)  BP(mean): --  RR: 16 (16 - 18)  SpO2: 96% (95% - 98%)    PAIN SCALE:     VNRS (Verbal Numerical Rating Scale)1-10             CONSTITUTIONAL: Well-appearing; well nourished; in no apparent distress.  HEAD: Normocephalic, atraumatic.  EYES: PERRL, EOM intact, conjunctiva and sclera WNL  NECK/LYMPH: Supple, non tender, no cervical lymphadenopathy  LUNGS: Normal chest excursion with respiration  ABD/GI: Normal bowel sounds; non-distended, non-tender, no palpable organomegaly.  Back: No evidence of deformity, or step off noted.  Mild pain to palpation of the para-spinal area.   EXT/MS: Normal ROM in all four extremities; non-tender to palpation; distal pulses are normal.  SKIN: Warm and dry; good skin turgor; no apparrent lesions or exudate.  NEURO: Awake, alert and oriented X3, no gross deficits        LABS:                          10.0   8.0   )-----------( 221      ( 17 Apr 2017 06:43 )             32.2     04-17    140  |  101  |  27.0<H>  ----------------------------<  111  4.0   |  25.0  |  1.56<H>    Ca    8.8      17 Apr 2017 06:43  Mg     1.8     04-16      PT/INR - ( 17 Apr 2017 06:43 )   PT: 34.7 sec;   INR: 3.08 ratio               RADIOLOGY:    Drug Screen:            [x ]  [place]  Reviewed and Copied to Chart     This report was requested by: Juan Edmonds | Reference #: [id number]  Others' Prescriptions  Patient Name: 	Dangelo Chicas 	YOB: 1940  Address: 	[address] [place] [address] 	Sex: 	Male  Rx Written 	Rx Dispensed 	Drug 	Quantity 	Days Supply 	Prescriber Name  03/16/2017 03/17/2017 	tramadol hcl 50 mg tablet 	60 	30 	Aida Mcclure, M  02/23/2017 02/25/2017 	oxycodone hcl 5 mg tablet 	30 	15 	Aida Mcclure, M  02/08/2017 02/13/2017 	tramadol hcl 50 mg tablet 	30 	15 	Aida Mcclure, M  01/05/2017 01/07/2017 	tramadol hcl 50 mg tablet 	30 	30 	RenAida, M  11/22/2016 11/23/2016 	tramadol hcl 50 mg tablet 	30 	30 	RenAida, M  10/13/2016 	10/13/2016 	tramadol hcl 50 mg tablet 	30 	30 	RenAida, M  06/16/2016 06/20/2016 	acetaminophen-cod #3 tablet 	30 	30 	Ren, Aida, M    * - Drugs marked with an asterisk are compound drugs. If the compound drug is made up of more than one controlled substance, then each controlled substance will be a separate row in the table.    To report suspicious activity related to controlled substances, please click here and provide any relevant information.  To send questions or comments about this report to the [place] [employer], please click here or call [phone] (Option 1).    For information re Chief Complaint: Right Thigh Pain    HPI:  76 y/o male with history of CHF,  AVR, CAD s/p CABG, HTN, HLD, Gout, chronic bilateral LE edema, chronic back pain, AAA s/p open repair, Peripheral neuropathy, COPD, chronic afib on coumadin, presented to the ED c/o generalized weakness.  brought in by family after increasing generalized weakness, and increasing leg edema, orthopnea, and falls earlier today. No reported head trauma, LOC. No focal weakness. Patient also with poor appetite over last few week. No reported bleeding, N/V/D. In the ED patient with evidence of decompensated CHF. Family also inquiring about LIUDMILA as they are aware currently patient is requiring increased care at home which can not safely be given by wife. (12 Apr 2017 21:07)      PAST MEDICAL & SURGICAL HISTORY:  Vasculitis determined by biopsy of skin  CKD (chronic kidney disease) stage 3, GFR 30-59 ml/min  Secondary hypertension  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, unspecified site  Acute on chronic congestive heart failure, unspecified congestive heart failure type  Lumbar disc disease  Afib  Kidney disease  Neuropathy  COPD (chronic obstructive pulmonary disease)  AAA (abdominal aortic aneurysm)  Aortic valve replaced  S/P appendectomy  S/P CABG x 1  No significant past surgical history      FAMILY HISTORY:  No pertinent family history in first degree relatives      SOCIAL HISTORY:  [ ] Denies Smoking, Alcohol, or Drug Use    Allergies    penicillins (Rash)    Intolerances        PAIN MEDICATIONS:  ondansetron Injectable 4milliGRAM(s) IV Push every 6 hours PRN  gabapentin 300milliGRAM(s) Oral two times a day  traMADol 50milliGRAM(s) Oral every 6 hours PRN  cyclobenzaprine 10milliGRAM(s) Oral three times a day PRN  oxyCODONE  5 mG/acetaminophen 325 mG 1Tablet(s) Oral every 6 hours PRN    Heme:    Antibiotics:  oseltamivir 30milliGRAM(s) Oral two times a day    Cardiovascular:  furosemide   Injectable 40milliGRAM(s) IV Push two times a day  amLODIPine   Tablet 5milliGRAM(s) Oral daily  metoprolol 50milliGRAM(s) Oral two times a day    GI:  pantoprazole    Tablet 40milliGRAM(s) Oral before breakfast    Endocrine:  allopurinol 100milliGRAM(s) Oral daily  colchicine 0.6milliGRAM(s) Oral two times a day  atorvastatin 10milliGRAM(s) Oral at bedtime    All Other Medications:  lidocaine   Patch 1Patch Transdermal daily      REVIEW OF SYSTEMS:    CONSTITUTIONAL: No fever, weight loss, or fatigue  EYES: No eye pain, visual disturbances, or discharge  ENMT:  No difficulty hearing, tinnitus, vertigo; No sinus or throat pain  NECK: No pain or stiffness  BREASTS: No pain, masses, or nipple discharge  RESPIRATORY: No cough, wheezing, chills or hemoptysis; No shortness of breath  CARDIOVASCULAR: No chest pain, palpitations, dizziness, or leg swelling  GASTROINTESTINAL: No abdominal or epigastric pain. No nausea, vomiting, or hematemesis; No diarrhea or constipation. No melena or hematochezia.  GENITOURINARY: No dysuria, frequency, hematuria, or incontinence  NEUROLOGICAL: No headaches, memory loss, loss of strength, numbness, or tremors  SKIN: No itching, burning, rashes, or lesions   LYMPH NODES: No enlarged glands  ENDOCRINE: No heat or cold intolerance; No hair loss  MUSCULOSKELETAL: No joint pain or swelling; No muscle, back, or extremity pain  PSYCHIATRIC: No depression, anxiety, mood swings, or difficulty sleeping  HEME/LYMPH: No easy bruising, or bleeding gums  ALLERY AND IMMUNOLOGIC: No hives or eczema      Vital Signs Last 24 Hrs  T(C): 36.7, Max: 36.7 (04-17 @ 05:04)  T(F): 98, Max: 98 (04-17 @ 05:04)  HR: 79 (71 - 79)  BP: 138/62 (128/70 - 140/54)  BP(mean): --  RR: 16 (16 - 18)  SpO2: 96% (95% - 98%)    PAIN SCALE:     VNRS (Verbal Numerical Rating Scale)1-10             CONSTITUTIONAL: Well-appearing; well nourished; in no apparent distress.  HEAD: Normocephalic, atraumatic.  EYES: PERRL, EOM intact, conjunctiva and sclera WNL  NECK/LYMPH: Supple, non tender, no cervical lymphadenopathy  LUNGS: Normal chest excursion with respiration  ABD/GI: Normal bowel sounds; non-distended, non-tender, no palpable organomegaly.  Back: No evidence of deformity, or step off noted.  Mild pain to palpation of the para-spinal area.   EXT/MS: Normal ROM in all four extremities; non-tender to palpation; distal pulses are normal.  SKIN: Warm and dry; good skin turgor; no apparrent lesions or exudate.  NEURO: Awake, alert and oriented X3, no gross deficits        LABS:                          10.0   8.0   )-----------( 221      ( 17 Apr 2017 06:43 )             32.2     04-17    140  |  101  |  27.0<H>  ----------------------------<  111  4.0   |  25.0  |  1.56<H>    Ca    8.8      17 Apr 2017 06:43  Mg     1.8     04-16      PT/INR - ( 17 Apr 2017 06:43 )   PT: 34.7 sec;   INR: 3.08 ratio               RADIOLOGY:    Drug Screen:            [x ]  [place]  Reviewed and Copied to Chart     This report was requested by: Juan Edmonds | Reference #: [id number]  Others' Prescriptions  Patient Name: 	Dangelo Chicas 	YOB: 1940  Address: 	[address] [place] [address] 	Sex: 	Male  Rx Written 	Rx Dispensed 	Drug 	Quantity 	Days Supply 	Prescriber Name  03/16/2017 03/17/2017 	tramadol hcl 50 mg tablet 	60 	30 	RenAida, M  02/23/2017 02/25/2017 	oxycodone hcl 5 mg tablet 	30 	15 	Aida Mcclure, M  02/08/2017 02/13/2017 	tramadol hcl 50 mg tablet 	30 	15 	Aida Mcclure, M  01/05/2017 01/07/2017 	tramadol hcl 50 mg tablet 	30 	30 	RenAida, M  11/22/2016 11/23/2016 	tramadol hcl 50 mg tablet 	30 	30 	RenAida, M  10/13/2016 	10/13/2016 	tramadol hcl 50 mg tablet 	30 	30 	RenAida, M  06/16/2016 06/20/2016 	acetaminophen-cod #3 tablet 	30 	30 	Aida Mcclure, M    * - Drugs marked with an asterisk are compound drugs. If the compound drug is made up of more than one controlled substance, then each controlled substance will be a separate row in the table.    To report suspicious activity related to controlled substances, please click here and provide any relevant information.  To send questions or comments about this report to the [place] of Narcotic Enforcement, please click here or call [phone] (Option 1).    For information re Chief Complaint: Right Thigh Pain    HPI:  76 y/o male with history of CHF,  AVR, CAD s/p CABG, HTN, HLD, Gout, chronic bilateral LE edema, chronic back pain, AAA s/p open repair, Peripheral neuropathy, COPD, chronic afib on coumadin, Patient presented to the ED c/o generalized weakness, increasing leg edema, orthopnea, and multiple falls recently. Patient admits pain has gotten progressively worse; constant, throbbing, aching and shooting, across the lower back, with radiation into left groin and thigh. Patient admits to decrease appetite. Denies head injury or LOC;  bowel or bladder incontinence, fever or chills. Patient is well known to our office and is treated by Dr. Rothman.      PAST MEDICAL & SURGICAL HISTORY:  Vasculitis determined by biopsy of skin  CKD (chronic kidney disease) stage 3, GFR 30-59 ml/min  Secondary hypertension  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, unspecified site  Acute on chronic congestive heart failure, unspecified congestive heart failure type  Lumbar disc disease  Afib  Kidney disease  Neuropathy  COPD (chronic obstructive pulmonary disease)  AAA (abdominal aortic aneurysm)  Aortic valve replaced  S/P appendectomy  S/P CABG x 1  No significant past surgical history      FAMILY HISTORY:  No pertinent family history in first degree relatives      SOCIAL HISTORY:  [ ] Denies Smoking, Alcohol, or Drug Use    Allergies    penicillins (Rash)    Intolerances        PAIN MEDICATIONS:  ondansetron Injectable 4milliGRAM(s) IV Push every 6 hours PRN  gabapentin 300milliGRAM(s) Oral two times a day  traMADol 50milliGRAM(s) Oral every 6 hours PRN  cyclobenzaprine 10milliGRAM(s) Oral three times a day PRN  oxyCODONE  5 mG/acetaminophen 325 mG 1Tablet(s) Oral every 6 hours PRN    Heme:    Antibiotics:  oseltamivir 30milliGRAM(s) Oral two times a day    Cardiovascular:  furosemide   Injectable 40milliGRAM(s) IV Push two times a day  amLODIPine   Tablet 5milliGRAM(s) Oral daily  metoprolol 50milliGRAM(s) Oral two times a day    GI:  pantoprazole    Tablet 40milliGRAM(s) Oral before breakfast    Endocrine:  allopurinol 100milliGRAM(s) Oral daily  colchicine 0.6milliGRAM(s) Oral two times a day  atorvastatin 10milliGRAM(s) Oral at bedtime    All Other Medications:  lidocaine   Patch 1Patch Transdermal daily      REVIEW OF SYSTEMS:    CONSTITUTIONAL: No fever, weight loss, or fatigue  EYES: No eye pain, visual disturbances, or discharge  ENMT:  No difficulty hearing, tinnitus, vertigo; No sinus or throat pain  NECK: No pain or stiffness  BREASTS: No pain, masses, or nipple discharge  RESPIRATORY: No cough, wheezing, chills or hemoptysis; No shortness of breath  CARDIOVASCULAR: No chest pain, palpitations, dizziness, or leg swelling  GASTROINTESTINAL: No abdominal or epigastric pain. No nausea, vomiting, or hematemesis; No diarrhea or constipation. No melena or hematochezia.  GENITOURINARY: No dysuria, frequency, hematuria, or incontinence  NEUROLOGICAL: No headaches, memory loss, loss of strength, numbness, or tremors  SKIN: No itching, burning, rashes, or lesions   LYMPH NODES: No enlarged glands  ENDOCRINE: No heat or cold intolerance; No hair loss  MUSCULOSKELETAL: No joint pain or swelling; No muscle, back, or extremity pain  PSYCHIATRIC: No depression, anxiety, mood swings, or difficulty sleeping  HEME/LYMPH: No easy bruising, or bleeding gums  ALLERY AND IMMUNOLOGIC: No hives or eczema      Vital Signs Last 24 Hrs  T(C): 36.7, Max: 36.7 (04-17 @ 05:04)  T(F): 98, Max: 98 (04-17 @ 05:04)  HR: 79 (71 - 79)  BP: 138/62 (128/70 - 140/54)  BP(mean): --  RR: 16 (16 - 18)  SpO2: 96% (95% - 98%)    PAIN SCALE:     VNRS (Verbal Numerical Rating Scale)1-10             CONSTITUTIONAL: Well-appearing; well nourished; in no apparent distress.  HEAD: Normocephalic, atraumatic.  EYES: PERRL, EOM intact, conjunctiva and sclera WNL  NECK/LYMPH: Supple, non tender, no cervical lymphadenopathy  LUNGS: Normal chest excursion with respiration  ABD/GI: Normal bowel sounds; non-distended, non-tender, no palpable organomegaly.  Back: No evidence of deformity, or step off noted.  Mild pain to palpation of the para-spinal area.   EXT/MS: Normal ROM in all four extremities; non-tender to palpation; distal pulses are normal.  SKIN: Warm and dry; good skin turgor; no apparrent lesions or exudate.  NEURO: Awake, alert and oriented X3, no gross deficits        LABS:                          10.0   8.0   )-----------( 221      ( 17 Apr 2017 06:43 )             32.2     04-17    140  |  101  |  27.0<H>  ----------------------------<  111  4.0   |  25.0  |  1.56<H>    Ca    8.8      17 Apr 2017 06:43  Mg     1.8     04-16      PT/INR - ( 17 Apr 2017 06:43 )   PT: 34.7 sec;   INR: 3.08 ratio               RADIOLOGY:    Drug Screen:            [x ]  [place]  Reviewed and Copied to Chart     This report was requested by: Juan Edmonds | Reference #: [id number]  Others' Prescriptions  Patient Name: 	Dangelo Chicas 	YOB: 1940  Address: 	[address] [place] [address] 	Sex: 	Male  Rx Written 	Rx Dispensed 	Drug 	Quantity 	Days Supply 	Prescriber Name  03/16/2017 03/17/2017 	tramadol hcl 50 mg tablet 	60 	30 	Aida Mcclure, M  02/23/2017 02/25/2017 	oxycodone hcl 5 mg tablet 	30 	15 	Aida Mcclure, M  02/08/2017 02/13/2017 	tramadol hcl 50 mg tablet 	30 	15 	Aida Mcclure, M  01/05/2017 01/07/2017 	tramadol hcl 50 mg tablet 	30 	30 	Aida Mcclure, M  11/22/2016 11/23/2016 	tramadol hcl 50 mg tablet 	30 	30 	Aida Mcclure, M  10/13/2016 	10/13/2016 	tramadol hcl 50 mg tablet 	30 	30 	Aida Mcclure, M  06/16/2016 06/20/2016 	acetaminophen-cod #3 tablet 	30 	30 	RenAida parry, M    * - Drugs marked with an asterisk are compound drugs. If the compound drug is made up of more than one controlled substance, then each controlled substance will be a separate row in the table.    To report suspicious activity related to controlled substances, please click here and provide any relevant information.  To send questions or comments about this report to the [place] of Narcotic Enforcement, please click here or call [phone] (Option 1).    For information re

## 2017-04-18 LAB
ANION GAP SERPL CALC-SCNC: 15 MMOL/L — SIGNIFICANT CHANGE UP (ref 5–17)
BUN SERPL-MCNC: 29 MG/DL — HIGH (ref 8–20)
CALCIUM SERPL-MCNC: 9.4 MG/DL — SIGNIFICANT CHANGE UP (ref 8.6–10.2)
CHLORIDE SERPL-SCNC: 100 MMOL/L — SIGNIFICANT CHANGE UP (ref 98–107)
CO2 SERPL-SCNC: 24 MMOL/L — SIGNIFICANT CHANGE UP (ref 22–29)
CREAT SERPL-MCNC: 1.88 MG/DL — HIGH (ref 0.5–1.3)
GLUCOSE SERPL-MCNC: 103 MG/DL — SIGNIFICANT CHANGE UP (ref 70–115)
HCT VFR BLD CALC: 33.1 % — LOW (ref 42–52)
HGB BLD-MCNC: 10.4 G/DL — LOW (ref 14–18)
INR BLD: 1.91 RATIO — HIGH (ref 0.88–1.16)
MCHC RBC-ENTMCNC: 23.5 PG — LOW (ref 27–31)
MCHC RBC-ENTMCNC: 31.4 G/DL — LOW (ref 32–36)
MCV RBC AUTO: 74.9 FL — LOW (ref 80–94)
PLATELET # BLD AUTO: 276 K/UL — SIGNIFICANT CHANGE UP (ref 150–400)
POTASSIUM SERPL-MCNC: 4.4 MMOL/L — SIGNIFICANT CHANGE UP (ref 3.5–5.3)
POTASSIUM SERPL-SCNC: 4.4 MMOL/L — SIGNIFICANT CHANGE UP (ref 3.5–5.3)
PROTHROM AB SERPL-ACNC: 21.3 SEC — HIGH (ref 9.8–12.7)
RBC # BLD: 4.42 M/UL — LOW (ref 4.6–6.2)
RBC # FLD: 19.3 % — HIGH (ref 11–15.6)
SODIUM SERPL-SCNC: 139 MMOL/L — SIGNIFICANT CHANGE UP (ref 135–145)
WBC # BLD: 8 K/UL — SIGNIFICANT CHANGE UP (ref 4.8–10.8)
WBC # FLD AUTO: 8 K/UL — SIGNIFICANT CHANGE UP (ref 4.8–10.8)

## 2017-04-18 PROCEDURE — 99232 SBSQ HOSP IP/OBS MODERATE 35: CPT

## 2017-04-18 PROCEDURE — 99233 SBSQ HOSP IP/OBS HIGH 50: CPT

## 2017-04-18 RX ORDER — WARFARIN SODIUM 2.5 MG/1
5 TABLET ORAL ONCE
Qty: 0 | Refills: 0 | Status: COMPLETED | OUTPATIENT
Start: 2017-04-18 | End: 2017-04-18

## 2017-04-18 RX ADMIN — AMLODIPINE BESYLATE 5 MILLIGRAM(S): 2.5 TABLET ORAL at 05:20

## 2017-04-18 RX ADMIN — Medication 100 MILLIGRAM(S): at 05:23

## 2017-04-18 RX ADMIN — Medication 0.6 MILLIGRAM(S): at 18:15

## 2017-04-18 RX ADMIN — Medication 100 MILLIGRAM(S): at 12:36

## 2017-04-18 RX ADMIN — ALBUTEROL 2.5 MILLIGRAM(S): 90 AEROSOL, METERED ORAL at 09:47

## 2017-04-18 RX ADMIN — Medication 0.6 MILLIGRAM(S): at 05:20

## 2017-04-18 RX ADMIN — GABAPENTIN 300 MILLIGRAM(S): 400 CAPSULE ORAL at 18:16

## 2017-04-18 RX ADMIN — Medication 100 MILLIGRAM(S): at 18:16

## 2017-04-18 RX ADMIN — ATORVASTATIN CALCIUM 10 MILLIGRAM(S): 80 TABLET, FILM COATED ORAL at 21:48

## 2017-04-18 RX ADMIN — SODIUM CHLORIDE 3 MILLILITER(S): 9 INJECTION INTRAMUSCULAR; INTRAVENOUS; SUBCUTANEOUS at 21:48

## 2017-04-18 RX ADMIN — PANTOPRAZOLE SODIUM 40 MILLIGRAM(S): 20 TABLET, DELAYED RELEASE ORAL at 05:20

## 2017-04-18 RX ADMIN — Medication 40 MILLIGRAM(S): at 18:16

## 2017-04-18 RX ADMIN — WARFARIN SODIUM 5 MILLIGRAM(S): 2.5 TABLET ORAL at 21:48

## 2017-04-18 RX ADMIN — Medication 40 MILLIGRAM(S): at 05:19

## 2017-04-18 RX ADMIN — Medication 50 MILLIGRAM(S): at 18:16

## 2017-04-18 RX ADMIN — LIDOCAINE 1 PATCH: 4 CREAM TOPICAL at 00:27

## 2017-04-18 RX ADMIN — SODIUM CHLORIDE 3 MILLILITER(S): 9 INJECTION INTRAMUSCULAR; INTRAVENOUS; SUBCUTANEOUS at 14:57

## 2017-04-18 RX ADMIN — ALBUTEROL 2.5 MILLIGRAM(S): 90 AEROSOL, METERED ORAL at 03:33

## 2017-04-18 RX ADMIN — SODIUM CHLORIDE 3 MILLILITER(S): 9 INJECTION INTRAMUSCULAR; INTRAVENOUS; SUBCUTANEOUS at 05:19

## 2017-04-18 RX ADMIN — ALBUTEROL 2.5 MILLIGRAM(S): 90 AEROSOL, METERED ORAL at 15:07

## 2017-04-18 RX ADMIN — CYCLOBENZAPRINE HYDROCHLORIDE 10 MILLIGRAM(S): 10 TABLET, FILM COATED ORAL at 12:36

## 2017-04-18 RX ADMIN — LIDOCAINE 1 PATCH: 4 CREAM TOPICAL at 12:36

## 2017-04-18 RX ADMIN — ALBUTEROL 2.5 MILLIGRAM(S): 90 AEROSOL, METERED ORAL at 20:46

## 2017-04-18 RX ADMIN — Medication 50 MILLIGRAM(S): at 05:20

## 2017-04-18 RX ADMIN — Medication 30 MILLIGRAM(S): at 05:20

## 2017-04-18 RX ADMIN — GABAPENTIN 300 MILLIGRAM(S): 400 CAPSULE ORAL at 05:20

## 2017-04-18 NOTE — PROGRESS NOTE ADULT - SUBJECTIVE AND OBJECTIVE BOX
Renal :      CINDI HALE    [id number]    77y      Male    Patient is a 77y old  Male who presents with a chief complaint of my legs collapsed (12 Apr 2017 22:43)      INTERVAL HPI/OVERNIGHT EVENTS:    Patient started having pain last night, she got percocet and Lidoderm patch, he has Hx of chronic back pain, was treated with steroid injections in the past, denies fever, chills, chest pain, his SOB is improving along with his edema.     REVIEW OF SYSTEMS:    CONSTITUTIONAL: No fever, has fatigue  RESPIRATORY: No cough, shortness of breath is improving.   CARDIOVASCULAR: No chest pain, palpitations  GASTROINTESTINAL: No abdominal or epigastric pain. No nausea, vomiting  NEUROLOGICAL: No headaches,  loss of strength.  MISCELLANEOUS: pain in the back and groin pain      Vital Signs Last 24 Hrs  T(C): 36.6, Max: 36.9 (04-17 @ 12:54)  T(F): 97.8, Max: 98.5 (04-17 @ 12:54)  HR: 75 (74 - 79)  BP: 136/62 (132/60 - 142/66)  BP(mean): --  RR: 16 (16 - 16)  SpO2: 97% (96% - 98%)    PHYSICAL EXAM:    GENERAL: Elderly male looking anxious about pain.   HEENT: PERRL, +EOMI  NECK: soft, Supple, No JVD,   CHEST/LUNG: Miminal crackles bilaterally; No wheezing  HEART: S1S2+, Regular rate and rhythm; No murmurs  ABDOMEN: Soft, Nontender, Nondistended; Bowel sounds present  EXTREMITIES:  2+ Peripheral Pulses, +ve edema  SKIN: No rashes or lesions  NEURO: AAOX3, no focal deficits, no motor r sensory loss  PSYCH: anxious mood        LABS:                        10.0   8.0   )-----------( 221      ( 17 Apr 2017 06:43 )             32.2     04-17    140  |  101  |  27.0<H>  ----------------------------<  111  4.0   |  25.0  |  1.56<H>    Ca    8.8      17 Apr 2017 06:43  Mg     1.8     04-16      PT/INR - ( 17 Apr 2017 06:43 )   PT: 34.7 sec;   INR: 3.08 ratio          I&O's Summary  I & Os for 24h ending 17 Apr 2017 07:00  =============================================  IN: 120 ml / OUT: 450 ml / NET: -330 ml    I & Os for current day (as of 17 Apr 2017 18:57)  =============================================  IN: 700 ml / OUT: 465 ml / NET: 235 ml      MEDICATIONS  (STANDING):  sodium chloride 0.9% lock flush 3milliLiter(s) IV Push every 8 hours  furosemide   Injectable 40milliGRAM(s) IV Push two times a day  gabapentin 300milliGRAM(s) Oral two times a day  allopurinol 100milliGRAM(s) Oral daily  colchicine 0.6milliGRAM(s) Oral two times a day  pantoprazole    Tablet 40milliGRAM(s) Oral before breakfast  amLODIPine   Tablet 5milliGRAM(s) Oral daily  metoprolol 50milliGRAM(s) Oral two times a day  atorvastatin 10milliGRAM(s) Oral at bedtime  oseltamivir 30milliGRAM(s) Oral two times a day  ALBUTerol    0.083% 2.5milliGRAM(s) Nebulizer every 6 hours  lidocaine   Patch 1Patch Transdermal daily  guaiFENesin   Syrup  (Sugar-Free) 100milliGRAM(s) Oral every 6 hours    MEDICATIONS  (PRN):  ondansetron Injectable 4milliGRAM(s) IV Push every 6 hours PRN Nausea  cyclobenzaprine 10milliGRAM(s) Oral three times a day PRN Muscle Spasm  morphine  - Injectable 3milliGRAM(s) IV Push every 6 hours PRN BREAKTHROUGH PAIN ONLY, IF pain persists at least one hour after oral medication  oxyCODONE  5 mG/acetaminophen 325 mG 1Tablet(s) Oral every 6 hours PRN Moderate Pain (4 - 6)  oxyCODONE  5 mG/acetaminophen 325 mG 2Tablet(s) Oral every 6 hours PRN Severe Pain (7 - 10)

## 2017-04-18 NOTE — DIETITIAN INITIAL EVALUATION ADULT. - OTHER INFO
Pt admitted with CHF exacerbation. Unsure of UBW however reports recent weight gain due to swelling in his legs. Pt has a good appetite at home, reports poor appetite since admission, however slowly improving. Pt declined updating food preferences at this time. Noted 3+ LE edema.

## 2017-04-18 NOTE — CONSULT NOTE ADULT - ASSESSMENT
Assessment and Plan:      76 yo M with hx Gout, COPD, HTN, HLD, CHF, s/p AVR  (bio) and  CABG x1 eight years ago,  s/p open repair of AAA,  PAF on coumadin, chronic leg edema brought in by family after increasing generalized weakness, and increasing leg edema,had falls prior to admission. Denied syncope, focal weakness, LOC. Admitted poor appetite over last few week. Denied N/V/D, melena, rectal bleed, bleeding issues.     1) Acute on chronic congestive heart failure, unspecified congestive heart failure type  Lasix  Metoprolol  TTE    2) CKD (chronic kidney disease) stage 3, GFR 30-59 ml/min  H/o or current diagnosis of HF- ACEI/ARB contraindication unknown    3) hypertension  BP controlled  Continue afterload reduction  Monitor BP.    4) PAF, telemetry reviewed  Currently maintaining SR.  Continue coumadin.  Continue Metoprolol.    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  Out-patient FU with vascular surgery is recommended
Mr. Conteh had been  seen on consultation for possible vasculitic process, with negative outpatient work up.  has chronic anemia, anemia of CKD; may receive Procrit; check iron studies/ferritin.  Will follow if active Hematology issues; otherwise, suggest outpatient follow up.
78 y/o male with history of CHF,  AVR, CAD s/p CABG, HTN, HLD, Gout, chronic bilateral LE edema, chronic back pain, AAA s/p open repair, Peripheral neuropathy, COPD, chronic afib on coumadin, Patient presented to the ED c/o generalized weakness, increasing leg edema, orthopnea, and multiple falls recently. Patient admits pain has gotten progressively worse; constant, throbbing, aching and shooting, across the lower back, with radiation into left groin and thigh. Patient admits to decrease appetite. Denies head injury or LOC;  bowel or bladder incontinence, fever or chills. Patient is well known to our office and is treated by Dr. Rothman.

## 2017-04-18 NOTE — CONSULT NOTE ADULT - SUBJECTIVE AND OBJECTIVE BOX
CINDI CHICAS    [id number]    77y      Male  Asked by patient's wife to see Mr. Chicas who had been seen on consultation as outopatient by Dr. Humphrey 4/4/17 for possible vasculitis of the skin.  He has multiple medical issues that include CAD, AVR, A fib, Neuropathy, CKD, Hx of Prostate cancer treated 10 years ago with Brachytherapy.  He was admitted to [facility] complaining of bilateral leg pain, inability to ambulate.  He has no complaints of bleeding and is breathing comfortably with oxygen.        Social hx:  former smoker, non-drinker, ; retired  Fhx:  Negative for malignancies in first degree relatives      Patient started having pain last night, received percocet and Lidoderm patch.  He  has a Hx of chronic back pain, was treated with steroid injections in the past,.  He currently denies chest pain, fevers, chills or sweats.  REVIEW OF SYSTEMS:    CONSTITUTIONAL: No fever, has fatigue  RESPIRATORY: No cough, shortness of breath is improving.   CARDIOVASCULAR: No chest pain, palpitations  GASTROINTESTINAL: No abdominal or epigastric pain. No nausea, vomiting  NEUROLOGICAL: No headaches,  loss of strength.  MISCELLANEOUS: pain in the back and groin pain      Vital Signs Last 24 Hrs  T(C): 36.6, Max: 36.9 (04-17 @ 12:54)  T(F): 97.8, Max: 98.5 (04-17 @ 12:54)  HR: 75 (74 - 79)  BP: 136/62 (132/60 - 142/66)  BP(mean): --  RR: 16 (16 - 16)  SpO2: 97% (96% - 98%)    PHYSICAL EXAM:    GENERAL: Elderly male looking anxious about pain.   HEENT: PERRL, +EOMI  NECK: soft, Supple, No JVD,   CHEST/LUNG: Miminal crackles bilaterally; No wheezing  HEART: S1S2+, Regular rate and rhythm; No murmurs  ABDOMEN: Soft, Nontender, Nondistended; Bowel sounds present  EXTREMITIES:  2+ Peripheral Pulses, +ve edema  SKIN: Has raised lesions on writs, echymoses on hands  NEURO: AAOX3, no focal deficits, no motor r sensory loss  PSYCH: anxious mood        LABS:                        10.0   8.0   )-----------( 221      ( 17 Apr 2017 06:43 )             32.2     04-17    140  |  101  |  27.0<H>  ----------------------------<  111  4.0   |  25.0  |  1.56<H>    Ca    8.8      17 Apr 2017 06:43  Mg     1.8     04-16      PT/INR - ( 17 Apr 2017 06:43 )   PT: 34.7 sec;   INR: 3.08 ratio          I&O's Summary  I & Os for 24h ending 17 Apr 2017 07:00  =============================================  IN: 120 ml / OUT: 450 ml / NET: -330 ml    I & Os for current day (as of 17 Apr 2017 18:57)  =============================================  IN: 700 ml / OUT: 465 ml / NET: 235 ml      MEDICATIONS  (STANDING):  sodium chloride 0.9% lock flush 3milliLiter(s) IV Push every 8 hours  furosemide   Injectable 40milliGRAM(s) IV Push two times a day  gabapentin 300milliGRAM(s) Oral two times a day  allopurinol 100milliGRAM(s) Oral daily  colchicine 0.6milliGRAM(s) Oral two times a day  pantoprazole    Tablet 40milliGRAM(s) Oral before breakfast  amLODIPine   Tablet 5milliGRAM(s) Oral daily  metoprolol 50milliGRAM(s) Oral two times a day  atorvastatin 10milliGRAM(s) Oral at bedtime  oseltamivir 30milliGRAM(s) Oral two times a day  ALBUTerol    0.083% 2.5milliGRAM(s) Nebulizer every 6 hours  lidocaine   Patch 1Patch Transdermal daily  guaiFENesin   Syrup  (Sugar-Free) 100milliGRAM(s) Oral every 6 hours    MEDICATIONS  (PRN):  ondansetron Injectable 4milliGRAM(s) IV Push every 6 hours PRN Nausea  cyclobenzaprine 10milliGRAM(s) Oral three times a day PRN Muscle Spasm  morphine  - Injectable 3milliGRAM(s) IV Push every 6 hours PRN BREAKTHROUGH PAIN ONLY, IF pain persists at least one hour after oral medication  oxyCODONE  5 mG/acetaminophen 325 mG 1Tablet(s) Oral every 6 hours PRN Moderate Pain (4 - 6)  oxyCODONE  5 mG/acetaminophen 325 mG 2Tablet(s) Oral every 6 hours PRN Severe Pain (7 - 10)

## 2017-04-18 NOTE — PROGRESS NOTE ADULT - SUBJECTIVE AND OBJECTIVE BOX
CARDIOLOGY PROGRESS NOTE   ([facility])                                                                                                        Subjective:     Vitals:  T(C): 36.6, Max: 36.6 (04-17 @ 17:51)  HR: 72 (69 - 79)  BP: 128/66 (110/58 - 140/90)  RR: 18 (16 - 19)  SpO2: 94% (92% - 97%)  Wt(kg): --  I&O's Summary  I & Os for 24h ending 18 Apr 2017 07:00  =============================================  IN: 700 ml / OUT: 465 ml / NET: 235 ml    I & Os for current day (as of 18 Apr 2017 17:02)  =============================================  IN: 240 ml / OUT: 0 ml / NET: 240 ml        PHYSICAL EXAM:  Appearance: Normal	  HEENT:   Atraumatic  Cardiovascular: Normal S1 S2, No JVD, No murmurs, No edema  Respiratory: Lungs clear to auscultation	  Gastrointestinal:  Soft, Non-tender, + BS	  Skin: No rashes, No ecchymoses, No cyanosis  Neurologic: Alert and awake, able to move extremities  Extremities: No edema    TELEMETRY: 	    ECG:  	      DIAGNOSTIC TESTING:  [ ] Echocardiogram:  [ ]  Catheterization:  [ ] Stress Test:    OTHER: 	      CURRENT MEDICATIONS:  furosemide   Injectable 40milliGRAM(s) IV Push two times a day  amLODIPine   Tablet 5milliGRAM(s) Oral daily  metoprolol 50milliGRAM(s) Oral two times a day      ALBUTerol    0.083% 2.5milliGRAM(s) Nebulizer every 6 hours  guaiFENesin   Syrup  (Sugar-Free) 100milliGRAM(s) Oral every 6 hours    ondansetron Injectable 4milliGRAM(s) IV Push every 6 hours PRN  gabapentin 300milliGRAM(s) Oral two times a day  cyclobenzaprine 10milliGRAM(s) Oral three times a day PRN  morphine  - Injectable 3milliGRAM(s) IV Push every 6 hours PRN  oxyCODONE  5 mG/acetaminophen 325 mG 1Tablet(s) Oral every 6 hours PRN  oxyCODONE  5 mG/acetaminophen 325 mG 2Tablet(s) Oral every 6 hours PRN    pantoprazole    Tablet 40milliGRAM(s) Oral before breakfast    allopurinol 100milliGRAM(s) Oral daily  colchicine 0.6milliGRAM(s) Oral two times a day  atorvastatin 10milliGRAM(s) Oral at bedtime    lidocaine   Patch 1Patch Transdermal daily      LABS:	 	    CARDIAC MARKERS:  Troponin I:   CPK total =  CK MB=   CKMB index=                           10.4   8.0   )-----------( 276      ( 18 Apr 2017 06:38 )             33.1     04-18    139  |  100  |  29.0<H>  ----------------------------<  103  4.4   |  24.0  |  1.88<H>    Ca    9.4      18 Apr 2017 06:38      TSH: Thyroid Stimulating Hormone, Serum: 3.41 uIU/mL (04-13 @ 07:24)      76 yo M with hx Gout, CKD -3,  COPD, HTN, HLD, CHF, s/p AVR  (bio) and  CABG x1 eight years ago,  s/p open repair of AAA,  PAF on coumadin, chronic leg edema brought in by family after increasing generalized weakness, and increasing leg edema, had falls prior to admission admitted with weakness  and fall. Coumadin was stopped as per patient and family wishes due to high risk of fall.     1) Acute on chronic diastolic CHF  TTE c/w normal LV systolic function LVEF 65% normal function bioprosthetic AVR  Not on ACE-I b/o CKD.  Improved        2) PAF: Off coumadin due to risk of fall as pe patient and family wishes.   Continue Metoprolol.    3) CKD (chronic kidney disease) stage 3, GFR 30-59 ml/min.          4) Influenza B.  Plan: Tamiflu.       5) s/p CABG and AVR (bio)  TTE to evaluate biventricular function and valvular status    6) Chronic anemia, anemia of CKD; may receive Procrit as per hematology.  on colchicine CARDIOLOGY PROGRESS NOTE   ([facility])                                                                                                        Subjective:  feels well, sitting on the chair, denied dyspnea, CP. His  daughter Aniya who is an RN is at the bed side. Daughter stated that the patient hallucinates at times although he used to hallucinates at home at times she thinks it is worsening    Vitals:  T(C): 36.6, Max: 36.6 (04-17 @ 17:51)  HR: 72 (69 - 79)  BP: 128/66 (110/58 - 140/90)  RR: 18 (16 - 19)  SpO2: 94% (92% - 97%)  Wt(kg): --  I&O's Summary  I & Os for 24h ending 18 Apr 2017 07:00  =============================================  IN: 700 ml / OUT: 465 ml / NET: 235 ml    I & Os for current day (as of 18 Apr 2017 17:02)  =============================================  IN: 240 ml / OUT: 0 ml / NET: 240 ml        PHYSICAL EXAM:  Appearance: Normal	  HEENT:   Atraumatic  Cardiovascular: Normal S1 S2, No JVD, No murmurs, 1/6 SM at RUSB  Respiratory: Lungs clear to auscultation	  Gastrointestinal:  Soft, Non-tender, + BS	  Skin: No rashes, No ecchymoses, No cyanosis  Neurologic: Alert and awake, able to move extremities  Extremities: +  edema bilateral ankles      CURRENT MEDICATIONS:  furosemide   Injectable 40milliGRAM(s) IV Push two times a day  amLODIPine   Tablet 5milliGRAM(s) Oral daily  metoprolol 50milliGRAM(s) Oral two times a day      ALBUTerol    0.083% 2.5milliGRAM(s) Nebulizer every 6 hours  guaiFENesin   Syrup  (Sugar-Free) 100milliGRAM(s) Oral every 6 hours    ondansetron Injectable 4milliGRAM(s) IV Push every 6 hours PRN  gabapentin 300milliGRAM(s) Oral two times a day  cyclobenzaprine 10milliGRAM(s) Oral three times a day PRN  morphine  - Injectable 3milliGRAM(s) IV Push every 6 hours PRN  oxyCODONE  5 mG/acetaminophen 325 mG 1Tablet(s) Oral every 6 hours PRN  oxyCODONE  5 mG/acetaminophen 325 mG 2Tablet(s) Oral every 6 hours PRN    pantoprazole    Tablet 40milliGRAM(s) Oral before breakfast    allopurinol 100milliGRAM(s) Oral daily  colchicine 0.6milliGRAM(s) Oral two times a day  atorvastatin 10milliGRAM(s) Oral at bedtime    lidocaine   Patch 1Patch Transdermal daily      LABS:	 	                          10.4   8.0   )-----------( 276      ( 18 Apr 2017 06:38 )             33.1     04-18    139  |  100  |  29.0<H>  ----------------------------<  103  4.4   |  24.0  |  1.88<H>    Ca    9.4      18 Apr 2017 06:38      TSH: Thyroid Stimulating Hormone, Serum: 3.41 uIU/mL (04-13 @ 07:24)

## 2017-04-18 NOTE — PROGRESS NOTE ADULT - ASSESSMENT
78 yo M with hx Gout, CKD -3,  COPD, HTN, HLD, CHF, s/p AVR  (bio) and  CABG x1 eight years ago,  s/p open repair of AAA,  PAF on coumadin, chronic leg edema brought in by family after increasing generalized weakness, and increasing leg edema, had falls prior to admission admitted with weakness  and fall. Coumadin was stopped as per patient and family wishes due to high risk of fall.     1) Acute on chronic diastolic CHF  TTE c/w normal LV systolic function LVEF 65% normal function bioprosthetic AVR  Not on ACE-I b/o CKD.  Improved on diuretics        2) PAF: Off coumadin due to risk of fall as per patient and family wishes.   Continue Metoprolol.  I d/w the patient and his daughter Aniya (who is  RN) at the bed side in length.  I discussed CATHERINE closure procedure [address].   Aniya stated that the family and the patient will make decision later either to resume postcoagulation or stay off anticoagulation stating that patient is unwilling to proceed with CATHERINE closure procedure at this point.  Pending family decision patient is off Anticoagulation.  If decision is o remain off anticoagulation would start  mg daily.    3) CKD (chronic kidney disease) stage 3, GFR 30-59 ml/min.          4) Influenza B. Tamiflu.       5) s/p CABG and AVR (bio)  TTE to evaluate biventricular function and valvular status    6) Chronic anemia, anemia of CKD; may receive Procrit as per hematology.  on colchicine    7) Occasional hallucination As per primary team

## 2017-04-19 LAB
ANION GAP SERPL CALC-SCNC: 14 MMOL/L — SIGNIFICANT CHANGE UP (ref 5–17)
BUN SERPL-MCNC: 37 MG/DL — HIGH (ref 8–20)
CALCIUM SERPL-MCNC: 9.2 MG/DL — SIGNIFICANT CHANGE UP (ref 8.6–10.2)
CHLORIDE SERPL-SCNC: 97 MMOL/L — LOW (ref 98–107)
CO2 SERPL-SCNC: 22 MMOL/L — SIGNIFICANT CHANGE UP (ref 22–29)
CREAT SERPL-MCNC: 2.07 MG/DL — HIGH (ref 0.5–1.3)
GLUCOSE SERPL-MCNC: 106 MG/DL — SIGNIFICANT CHANGE UP (ref 70–115)
HCT VFR BLD CALC: 33.3 % — LOW (ref 42–52)
HGB BLD-MCNC: 10.5 G/DL — LOW (ref 14–18)
INR BLD: 2.91 RATIO — HIGH (ref 0.88–1.16)
MAGNESIUM SERPL-MCNC: 1.7 MG/DL — LOW (ref 1.8–2.5)
MCHC RBC-ENTMCNC: 23.3 PG — LOW (ref 27–31)
MCHC RBC-ENTMCNC: 31.5 G/DL — LOW (ref 32–36)
MCV RBC AUTO: 73.8 FL — LOW (ref 80–94)
PLATELET # BLD AUTO: 265 K/UL — SIGNIFICANT CHANGE UP (ref 150–400)
POTASSIUM SERPL-MCNC: 4.6 MMOL/L — SIGNIFICANT CHANGE UP (ref 3.5–5.3)
POTASSIUM SERPL-SCNC: 4.6 MMOL/L — SIGNIFICANT CHANGE UP (ref 3.5–5.3)
PROTHROM AB SERPL-ACNC: 32.7 SEC — HIGH (ref 9.8–12.7)
RBC # BLD: 4.51 M/UL — LOW (ref 4.6–6.2)
RBC # FLD: 18.7 % — HIGH (ref 11–15.6)
SODIUM SERPL-SCNC: 133 MMOL/L — LOW (ref 135–145)
WBC # BLD: 8.7 K/UL — SIGNIFICANT CHANGE UP (ref 4.8–10.8)
WBC # FLD AUTO: 8.7 K/UL — SIGNIFICANT CHANGE UP (ref 4.8–10.8)

## 2017-04-19 PROCEDURE — 99232 SBSQ HOSP IP/OBS MODERATE 35: CPT

## 2017-04-19 RX ORDER — LOPERAMIDE HCL 2 MG
4 TABLET ORAL ONCE
Qty: 0 | Refills: 0 | Status: COMPLETED | OUTPATIENT
Start: 2017-04-19 | End: 2017-04-19

## 2017-04-19 RX ORDER — ACETAMINOPHEN 500 MG
650 TABLET ORAL EVERY 6 HOURS
Qty: 0 | Refills: 0 | Status: DISCONTINUED | OUTPATIENT
Start: 2017-04-19 | End: 2017-04-23

## 2017-04-19 RX ORDER — WARFARIN SODIUM 2.5 MG/1
2.5 TABLET ORAL ONCE
Qty: 0 | Refills: 0 | Status: COMPLETED | OUTPATIENT
Start: 2017-04-19 | End: 2017-04-19

## 2017-04-19 RX ORDER — LOPERAMIDE HCL 2 MG
2 TABLET ORAL EVERY 4 HOURS
Qty: 0 | Refills: 0 | Status: DISCONTINUED | OUTPATIENT
Start: 2017-04-19 | End: 2017-04-23

## 2017-04-19 RX ORDER — MAGNESIUM OXIDE 400 MG ORAL TABLET 241.3 MG
400 TABLET ORAL
Qty: 0 | Refills: 0 | Status: DISCONTINUED | OUTPATIENT
Start: 2017-04-19 | End: 2017-04-23

## 2017-04-19 RX ADMIN — Medication 100 MILLIGRAM(S): at 12:40

## 2017-04-19 RX ADMIN — SODIUM CHLORIDE 3 MILLILITER(S): 9 INJECTION INTRAMUSCULAR; INTRAVENOUS; SUBCUTANEOUS at 22:43

## 2017-04-19 RX ADMIN — LIDOCAINE 1 PATCH: 4 CREAM TOPICAL at 12:40

## 2017-04-19 RX ADMIN — MAGNESIUM OXIDE 400 MG ORAL TABLET 400 MILLIGRAM(S): 241.3 TABLET ORAL at 10:00

## 2017-04-19 RX ADMIN — SODIUM CHLORIDE 3 MILLILITER(S): 9 INJECTION INTRAMUSCULAR; INTRAVENOUS; SUBCUTANEOUS at 06:18

## 2017-04-19 RX ADMIN — ALBUTEROL 2.5 MILLIGRAM(S): 90 AEROSOL, METERED ORAL at 15:00

## 2017-04-19 RX ADMIN — ALBUTEROL 2.5 MILLIGRAM(S): 90 AEROSOL, METERED ORAL at 03:14

## 2017-04-19 RX ADMIN — Medication 0.6 MILLIGRAM(S): at 18:52

## 2017-04-19 RX ADMIN — PANTOPRAZOLE SODIUM 40 MILLIGRAM(S): 20 TABLET, DELAYED RELEASE ORAL at 06:19

## 2017-04-19 RX ADMIN — Medication 4 MILLIGRAM(S): at 23:01

## 2017-04-19 RX ADMIN — Medication 100 MILLIGRAM(S): at 18:52

## 2017-04-19 RX ADMIN — Medication 0.6 MILLIGRAM(S): at 06:19

## 2017-04-19 RX ADMIN — ATORVASTATIN CALCIUM 10 MILLIGRAM(S): 80 TABLET, FILM COATED ORAL at 23:02

## 2017-04-19 RX ADMIN — Medication 40 MILLIGRAM(S): at 06:20

## 2017-04-19 RX ADMIN — Medication 100 MILLIGRAM(S): at 06:19

## 2017-04-19 RX ADMIN — ALBUTEROL 2.5 MILLIGRAM(S): 90 AEROSOL, METERED ORAL at 08:23

## 2017-04-19 RX ADMIN — GABAPENTIN 300 MILLIGRAM(S): 400 CAPSULE ORAL at 18:52

## 2017-04-19 RX ADMIN — MAGNESIUM OXIDE 400 MG ORAL TABLET 400 MILLIGRAM(S): 241.3 TABLET ORAL at 12:40

## 2017-04-19 RX ADMIN — GABAPENTIN 300 MILLIGRAM(S): 400 CAPSULE ORAL at 06:19

## 2017-04-19 RX ADMIN — Medication 50 MILLIGRAM(S): at 18:51

## 2017-04-19 RX ADMIN — Medication 100 MILLIGRAM(S): at 00:19

## 2017-04-19 RX ADMIN — SODIUM CHLORIDE 3 MILLILITER(S): 9 INJECTION INTRAMUSCULAR; INTRAVENOUS; SUBCUTANEOUS at 14:23

## 2017-04-19 RX ADMIN — Medication 50 MILLIGRAM(S): at 06:19

## 2017-04-19 RX ADMIN — AMLODIPINE BESYLATE 5 MILLIGRAM(S): 2.5 TABLET ORAL at 06:20

## 2017-04-19 RX ADMIN — MAGNESIUM OXIDE 400 MG ORAL TABLET 400 MILLIGRAM(S): 241.3 TABLET ORAL at 18:52

## 2017-04-19 RX ADMIN — Medication 40 MILLIGRAM(S): at 18:52

## 2017-04-19 RX ADMIN — LIDOCAINE 1 PATCH: 4 CREAM TOPICAL at 00:19

## 2017-04-19 NOTE — PROGRESS NOTE ADULT - ASSESSMENT
76 y/o male with acute on chronic CHF of unclear etiology, Hx of Afib, HLD, HTN, Neuropathy, AVR, COPD, CKD-3, CAD s/p CABG, Gout, Chronic back pain admitted with acute chf with preserved EF. no sys or diastolic dysfunction on ECHO. was on coumadin, initially family decided to stop coumadin, then they had a discussion with cardiologist and would like to resume coumadin, CHF improved with IV lasix. Influenza B + renally adjusted tamiflu completed

## 2017-04-19 NOTE — PROGRESS NOTE ADULT - ATTENDING COMMENTS
Back pain radiating to groin: patient is on flexeril and tramadol, PRN percocet, topical lidoderm patch, patient has Hx of inguinal hernia, no signs of any obstruction or bulging on exam, pain management team is managing pain.  Per PT home PT vs [facility]. However pt uses walker and has chr lymphedema of legs and family raises concerns.

## 2017-04-19 NOTE — PROGRESS NOTE ADULT - SUBJECTIVE AND OBJECTIVE BOX
CINDI HALE    [id number]    77y      Male    Patient is a 77y old  Male who presents with a chief complaint of my legs collapsed (12 Apr 2017 22:43)      INTERVAL HPI/OVERNIGHT EVENTS: SOB and edema are improving. Still having pain in groin when coughs, otherwise controlled. No active complaints. Denies fever, chills, chest pain, nausea, vomiting.    REVIEW OF SYSTEMS:    CONSTITUTIONAL: No fever, has fatigue  RESPIRATORY: intermittent cough, shortness of breath is improving.   CARDIOVASCULAR: No chest pain, palpitations  GASTROINTESTINAL: No abdominal or epigastric pain. No nausea, vomiting  NEUROLOGICAL: No headaches,  loss of strength.  MISCELLANEOUS: pain in the back and groin pain        Vital Signs Last 24 Hrs  T(C): 36.6, Max: 36.6 (04-18 @ 12:00)  T(F): 97.8, Max: 97.9 (04-18 @ 12:00)  HR: 67 (67 - 74)  BP: 150/68 (110/64 - 150/68)  BP(mean): --  RR: 18 (18 - 18)  SpO2: 92% (92% - 96%)    PHYSICAL EXAM:    GENERAL: Elderly male looking comfortable sitting in chair.   HEENT: PERRL, +EOMI  NECK: soft, Supple, No JVD,   CHEST/LUNG: Miminal crackles bilaterally; No wheezing  HEART: S1S2+, Regular rate and rhythm; No murmurs  ABDOMEN: Soft, Nontender, Nondistended; Bowel sounds present  EXTREMITIES:  2+ Peripheral Pulses, +ve edema  SKIN: No rashes or lesions  NEURO: AAOX3, no focal deficits, no motor r sensory loss  PSYCH: normal mood  Genital: No cough reflex in inguinal areas. No tenderness.      LABS:                        10.4   8.0   )-----------( 276      ( 18 Apr 2017 06:38 )             33.1     04-19    133<L>  |  97<L>  |  37.0<H>  ----------------------------<  106  4.6   |  22.0  |  2.07<H>    Ca    9.2      19 Apr 2017 06:37  Mg     1.7     04-19      PT/INR - ( 18 Apr 2017 06:38 )   PT: 21.3 sec;   INR: 1.91 ratio                 I&O's Summary    I & Os for current day (as of 19 Apr 2017 10:32)  =============================================  IN: 240 ml / OUT: 0 ml / NET: 240 ml      MEDICATIONS  (STANDING):  sodium chloride 0.9% lock flush 3milliLiter(s) IV Push every 8 hours  furosemide   Injectable 40milliGRAM(s) IV Push two times a day  gabapentin 300milliGRAM(s) Oral two times a day  allopurinol 100milliGRAM(s) Oral daily  colchicine 0.6milliGRAM(s) Oral two times a day  pantoprazole    Tablet 40milliGRAM(s) Oral before breakfast  amLODIPine   Tablet 5milliGRAM(s) Oral daily  metoprolol 50milliGRAM(s) Oral two times a day  atorvastatin 10milliGRAM(s) Oral at bedtime  ALBUTerol    0.083% 2.5milliGRAM(s) Nebulizer every 6 hours  lidocaine   Patch 1Patch Transdermal daily  guaiFENesin   Syrup  (Sugar-Free) 100milliGRAM(s) Oral every 6 hours  magnesium oxide 400milliGRAM(s) Oral three times a day with meals    MEDICATIONS  (PRN):  ondansetron Injectable 4milliGRAM(s) IV Push every 6 hours PRN Nausea  cyclobenzaprine 10milliGRAM(s) Oral three times a day PRN Muscle Spasm  morphine  - Injectable 3milliGRAM(s) IV Push every 6 hours PRN BREAKTHROUGH PAIN ONLY, IF pain persists at least one hour after oral medication  oxyCODONE  5 mG/acetaminophen 325 mG 1Tablet(s) Oral every 6 hours PRN Moderate Pain (4 - 6)  oxyCODONE  5 mG/acetaminophen 325 mG 2Tablet(s) Oral every 6 hours PRN Severe Pain (7 - 10)

## 2017-04-20 ENCOUNTER — APPOINTMENT (OUTPATIENT)
Dept: CT IMAGING | Facility: CLINIC | Age: 77
End: 2017-04-20

## 2017-04-20 LAB
ANION GAP SERPL CALC-SCNC: 17 MMOL/L — SIGNIFICANT CHANGE UP (ref 5–17)
ANION GAP SERPL CALC-SCNC: 17 MMOL/L — SIGNIFICANT CHANGE UP (ref 5–17)
BUN SERPL-MCNC: 42 MG/DL — HIGH (ref 8–20)
BUN SERPL-MCNC: 44 MG/DL — HIGH (ref 8–20)
C DIFF BY PCR RESULT: SIGNIFICANT CHANGE UP
CALCIUM SERPL-MCNC: 8.8 MG/DL — SIGNIFICANT CHANGE UP (ref 8.6–10.2)
CALCIUM SERPL-MCNC: 9.2 MG/DL — SIGNIFICANT CHANGE UP (ref 8.6–10.2)
CHLORIDE SERPL-SCNC: 94 MMOL/L — LOW (ref 98–107)
CHLORIDE SERPL-SCNC: 95 MMOL/L — LOW (ref 98–107)
CO2 SERPL-SCNC: 19 MMOL/L — LOW (ref 22–29)
CO2 SERPL-SCNC: 21 MMOL/L — LOW (ref 22–29)
CREAT SERPL-MCNC: 1.98 MG/DL — HIGH (ref 0.5–1.3)
CREAT SERPL-MCNC: 2.27 MG/DL — HIGH (ref 0.5–1.3)
GLUCOSE SERPL-MCNC: 95 MG/DL — SIGNIFICANT CHANGE UP (ref 70–115)
GLUCOSE SERPL-MCNC: 98 MG/DL — SIGNIFICANT CHANGE UP (ref 70–115)
HCT VFR BLD CALC: 30.6 % — LOW (ref 42–52)
HGB BLD-MCNC: 9.7 G/DL — LOW (ref 14–18)
INR BLD: 3.64 RATIO — HIGH (ref 0.88–1.16)
MAGNESIUM SERPL-MCNC: 1.8 MG/DL — SIGNIFICANT CHANGE UP (ref 1.8–2.5)
MCHC RBC-ENTMCNC: 23.2 PG — LOW (ref 27–31)
MCHC RBC-ENTMCNC: 31.7 G/DL — LOW (ref 32–36)
MCV RBC AUTO: 73.2 FL — LOW (ref 80–94)
PHOSPHATE SERPL-MCNC: 3 MG/DL — SIGNIFICANT CHANGE UP (ref 2.4–4.7)
PLATELET # BLD AUTO: 255 K/UL — SIGNIFICANT CHANGE UP (ref 150–400)
POTASSIUM SERPL-MCNC: 3.5 MMOL/L — SIGNIFICANT CHANGE UP (ref 3.5–5.3)
POTASSIUM SERPL-MCNC: 4.4 MMOL/L — SIGNIFICANT CHANGE UP (ref 3.5–5.3)
POTASSIUM SERPL-SCNC: 3.5 MMOL/L — SIGNIFICANT CHANGE UP (ref 3.5–5.3)
POTASSIUM SERPL-SCNC: 4.4 MMOL/L — SIGNIFICANT CHANGE UP (ref 3.5–5.3)
PROTHROM AB SERPL-ACNC: 41.1 SEC — HIGH (ref 9.8–12.7)
RBC # BLD: 4.18 M/UL — LOW (ref 4.6–6.2)
RBC # FLD: 18.7 % — HIGH (ref 11–15.6)
SODIUM SERPL-SCNC: 131 MMOL/L — LOW (ref 135–145)
SODIUM SERPL-SCNC: 132 MMOL/L — LOW (ref 135–145)
WBC # BLD: 6.4 K/UL — SIGNIFICANT CHANGE UP (ref 4.8–10.8)
WBC # FLD AUTO: 6.4 K/UL — SIGNIFICANT CHANGE UP (ref 4.8–10.8)

## 2017-04-20 PROCEDURE — 99232 SBSQ HOSP IP/OBS MODERATE 35: CPT

## 2017-04-20 RX ORDER — FUROSEMIDE 40 MG
40 TABLET ORAL
Qty: 0 | Refills: 0 | Status: DISCONTINUED | OUTPATIENT
Start: 2017-04-20 | End: 2017-04-23

## 2017-04-20 RX ORDER — LACTOBACILLUS ACIDOPHILUS 100MM CELL
1 CAPSULE ORAL
Qty: 0 | Refills: 0 | Status: DISCONTINUED | OUTPATIENT
Start: 2017-04-20 | End: 2017-04-23

## 2017-04-20 RX ORDER — MAGNESIUM SULFATE 500 MG/ML
1 VIAL (ML) INJECTION ONCE
Qty: 0 | Refills: 0 | Status: COMPLETED | OUTPATIENT
Start: 2017-04-20 | End: 2017-04-20

## 2017-04-20 RX ORDER — ASPIRIN/CALCIUM CARB/MAGNESIUM 324 MG
325 TABLET ORAL DAILY
Qty: 0 | Refills: 0 | Status: DISCONTINUED | OUTPATIENT
Start: 2017-04-20 | End: 2017-04-23

## 2017-04-20 RX ADMIN — LIDOCAINE 1 PATCH: 4 CREAM TOPICAL at 12:32

## 2017-04-20 RX ADMIN — LIDOCAINE 1 PATCH: 4 CREAM TOPICAL at 00:34

## 2017-04-20 RX ADMIN — GABAPENTIN 300 MILLIGRAM(S): 400 CAPSULE ORAL at 06:24

## 2017-04-20 RX ADMIN — Medication 100 MILLIGRAM(S): at 18:25

## 2017-04-20 RX ADMIN — SODIUM CHLORIDE 3 MILLILITER(S): 9 INJECTION INTRAMUSCULAR; INTRAVENOUS; SUBCUTANEOUS at 13:03

## 2017-04-20 RX ADMIN — Medication 1 TABLET(S): at 18:24

## 2017-04-20 RX ADMIN — Medication 100 MILLIGRAM(S): at 00:34

## 2017-04-20 RX ADMIN — Medication 0.6 MILLIGRAM(S): at 06:24

## 2017-04-20 RX ADMIN — Medication 2 MILLIGRAM(S): at 13:03

## 2017-04-20 RX ADMIN — Medication 40 MILLIGRAM(S): at 06:25

## 2017-04-20 RX ADMIN — GABAPENTIN 300 MILLIGRAM(S): 400 CAPSULE ORAL at 18:25

## 2017-04-20 RX ADMIN — Medication 325 MILLIGRAM(S): at 18:24

## 2017-04-20 RX ADMIN — ALBUTEROL 2.5 MILLIGRAM(S): 90 AEROSOL, METERED ORAL at 15:43

## 2017-04-20 RX ADMIN — ALBUTEROL 2.5 MILLIGRAM(S): 90 AEROSOL, METERED ORAL at 09:52

## 2017-04-20 RX ADMIN — MAGNESIUM OXIDE 400 MG ORAL TABLET 400 MILLIGRAM(S): 241.3 TABLET ORAL at 09:52

## 2017-04-20 RX ADMIN — MAGNESIUM OXIDE 400 MG ORAL TABLET 400 MILLIGRAM(S): 241.3 TABLET ORAL at 18:24

## 2017-04-20 RX ADMIN — SODIUM CHLORIDE 3 MILLILITER(S): 9 INJECTION INTRAMUSCULAR; INTRAVENOUS; SUBCUTANEOUS at 06:25

## 2017-04-20 RX ADMIN — Medication 1 TABLET(S): at 12:32

## 2017-04-20 RX ADMIN — Medication 50 MILLIGRAM(S): at 06:24

## 2017-04-20 RX ADMIN — Medication 100 MILLIGRAM(S): at 12:32

## 2017-04-20 RX ADMIN — AMLODIPINE BESYLATE 5 MILLIGRAM(S): 2.5 TABLET ORAL at 06:24

## 2017-04-20 RX ADMIN — Medication 40 MILLIGRAM(S): at 18:24

## 2017-04-20 RX ADMIN — Medication 650 MILLIGRAM(S): at 18:26

## 2017-04-20 RX ADMIN — MAGNESIUM OXIDE 400 MG ORAL TABLET 400 MILLIGRAM(S): 241.3 TABLET ORAL at 12:32

## 2017-04-20 RX ADMIN — Medication 100 MILLIGRAM(S): at 06:24

## 2017-04-20 RX ADMIN — ALBUTEROL 2.5 MILLIGRAM(S): 90 AEROSOL, METERED ORAL at 03:46

## 2017-04-20 RX ADMIN — Medication 0.6 MILLIGRAM(S): at 23:41

## 2017-04-20 RX ADMIN — PANTOPRAZOLE SODIUM 40 MILLIGRAM(S): 20 TABLET, DELAYED RELEASE ORAL at 06:24

## 2017-04-20 RX ADMIN — Medication 2 MILLIGRAM(S): at 18:27

## 2017-04-20 RX ADMIN — ATORVASTATIN CALCIUM 10 MILLIGRAM(S): 80 TABLET, FILM COATED ORAL at 23:41

## 2017-04-20 RX ADMIN — SODIUM CHLORIDE 3 MILLILITER(S): 9 INJECTION INTRAMUSCULAR; INTRAVENOUS; SUBCUTANEOUS at 21:09

## 2017-04-20 RX ADMIN — Medication 50 MILLIGRAM(S): at 18:26

## 2017-04-20 RX ADMIN — ALBUTEROL 2.5 MILLIGRAM(S): 90 AEROSOL, METERED ORAL at 20:01

## 2017-04-20 RX ADMIN — Medication 100 GRAM(S): at 02:38

## 2017-04-20 NOTE — PROGRESS NOTE ADULT - PROBLEM SELECTOR PLAN 6
Discussed with pt, wife, and daughter lebron, they do not want pt on coumadin. Cardiology team updated.

## 2017-04-20 NOTE — PROGRESS NOTE ADULT - ASSESSMENT
76 y/o male with acute on chronic CHF of unclear etiology, Hx of Afib, HLD, HTN, Neuropathy, AVR, COPD, CKD-3, CAD s/p CABG, Gout, Chronic back pain admitted with acute chf with preserved EF. no sys or diastolic dysfunction on ECHO. was on coumadin, initially family decided to stop coumadin d/t fall risk, then they had a discussion with cardiologist and would like to resume coumadin, CHF improved with IV lasix. Influenza B + renally adjusted tamiflu completed

## 2017-04-20 NOTE — PROGRESS NOTE ADULT - SUBJECTIVE AND OBJECTIVE BOX
CARDIOLOGY PROGRESS NOTE   ([facility])                                                                                                        Subjective: feels better, sitting in the chair, denied CP, dyspnea    Vitals:  T(C): 36.3, Max: 36.9 (04-20 @ 11:10)  HR: 79 (66 - 79)  BP: 122/60 (118/66 - 134/56)  RR: 16 (16 - 20)  SpO2: 96% (91% - 99%)  Wt(kg): --  I&O's Summary  I & Os for 24h ending 20 Apr 2017 07:00  =============================================  IN: 700 ml / OUT: 0 ml / NET: 700 ml    I & Os for current day (as of 20 Apr 2017 19:05)  =============================================  IN: 480 ml / OUT: 300 ml / NET: 180 ml        PHYSICAL EXAM:  Appearance: Normal	  HEENT:   Atraumatic  Cardiovascular: +S1 S2, No JVD, No murmurs,   Respiratory: Lungs clear to auscultation	  Gastrointestinal:  Soft, Non-tender, + BS	  Skin: No rashes, No ecchymoses, No cyanosis  Neurologic: Alert and awake, able to move extremities  Extremities: bilateral lower extemity edema with chronic stasis dermatitis changes        CURRENT MEDICATIONS:  amLODIPine   Tablet 5milliGRAM(s) Oral daily  metoprolol 50milliGRAM(s) Oral two times a day  furosemide    Tablet 40milliGRAM(s) Oral two times a day      ALBUTerol    0.083% 2.5milliGRAM(s) Nebulizer every 6 hours  guaiFENesin   Syrup  (Sugar-Free) 100milliGRAM(s) Oral every 6 hours    ondansetron Injectable 4milliGRAM(s) IV Push every 6 hours PRN  gabapentin 300milliGRAM(s) Oral two times a day  cyclobenzaprine 10milliGRAM(s) Oral three times a day PRN  morphine  - Injectable 3milliGRAM(s) IV Push every 6 hours PRN  oxyCODONE  5 mG/acetaminophen 325 mG 1Tablet(s) Oral every 6 hours PRN  oxyCODONE  5 mG/acetaminophen 325 mG 2Tablet(s) Oral every 6 hours PRN  acetaminophen   Tablet. 650milliGRAM(s) Oral every 6 hours PRN  aspirin 325milliGRAM(s) Oral daily    pantoprazole    Tablet 40milliGRAM(s) Oral before breakfast  loperamide 2milliGRAM(s) Oral every 4 hours PRN    allopurinol 100milliGRAM(s) Oral daily  colchicine 0.6milliGRAM(s) Oral two times a day  atorvastatin 10milliGRAM(s) Oral at bedtime    lidocaine   Patch 1Patch Transdermal daily  magnesium oxide 400milliGRAM(s) Oral three times a day with meals      LABS:	 	                            9.7    6.4   )-----------( 255      ( 20 Apr 2017 06:44 )             30.6     04-20    131<L>  |  95<L>  |  44.0<H>  ----------------------------<  98  3.5   |  19.0<L>  |  1.98<H>    Ca    8.8      20 Apr 2017 06:44  Phos  3.0     04-20  Mg     1.8     04-20        nsetron Injectable 4milliGRAM(s) IV Push every 6 hours PRN Nausea  cyclobenzaprine 10milliGRAM(s) Oral three times a day PRN Muscle Spasm  morphine  - Injectable 3milliGRAM(s) IV Push every 6 hours PRN BREAKTHROUGH PAIN ONLY, IF pain persists at least one hour after oral medication  oxyCODONE  5 mG/acetaminophen 325 mG 1Tablet(s) Oral every 6 hours PRN Moderate Pain (4 - 6)  oxyCODONE  5 mG/acetaminophen 325 mG 2Tablet(s) Oral every 6 hours PRN Severe Pain (7 - 10)  acetaminophen   Tablet. 650milliGRAM(s) Oral every 6 hours PRN Mild Pain (1 - 3)  loperamide 2milliGRAM(s) Oral every 4 hours PRN Diarrhea

## 2017-04-20 NOTE — PROGRESS NOTE ADULT - SUBJECTIVE AND OBJECTIVE BOX
CINDI HALE    [id number]    77y      Male    Patient is a 77y old  Male who presents with a chief complaint of my legs collapsed (12 Apr 2017 22:43)      INTERVAL HPI/OVERNIGHT EVENTS: SOB and edema are continuing to improve. No active complaints. Denies fever, chills, chest pain, nausea, vomiting.    REVIEW OF SYSTEMS:    CONSTITUTIONAL: No fever, has fatigue  RESPIRATORY: intermittent cough, shortness of breath is improving.   CARDIOVASCULAR: No chest pain, palpitations  GASTROINTESTINAL: No abdominal, No nausea, vomiting. has some diarrhea  NEUROLOGICAL: No headaches,  loss of strength.  MISCELLANEOUS: pain in the back and groin pain         Vital Signs Last 24 Hrs  T(C): 36.9, Max: 36.9 (04-20 @ 11:10)  T(F): 98.4, Max: 98.4 (04-20 @ 11:10)  HR: 66 (66 - 78)  BP: 134/56 (118/66 - 138/56)  BP(mean): --  RR: 16 (16 - 20)  SpO2: 99% (92% - 99%)    PHYSICAL EXAM:    GENERAL: Elderly male looking comfortable sitting in chair.   HEENT: PERRL, +EOMI  NECK: soft, Supple, No JVD,   CHEST/LUNG: Miminal crackles bilaterally; No wheezing  HEART: S1S2+, Regular rate and rhythm; No murmurs  ABDOMEN: Soft, Nontender, Nondistended; Bowel sounds present  EXTREMITIES:  2+ Peripheral Pulses, +ve edema  SKIN: No rashes or lesions  NEURO: AAOX3, no focal deficits, no motor or sensory loss  PSYCH: normal mood      LABS:                        9.7    6.4   )-----------( 255      ( 20 Apr 2017 06:44 )             30.6     04-20    131<L>  |  95<L>  |  44.0<H>  ----------------------------<  98  3.5   |  19.0<L>  |  1.98<H>    Ca    8.8      20 Apr 2017 06:44  Phos  3.0     04-20  Mg     1.8     04-20      PT/INR - ( 20 Apr 2017 06:44 )   PT: 41.1 sec;   INR: 3.64 ratio      C. diff: negative               I&O's Summary  I & Os for 24h ending 20 Apr 2017 07:00  =============================================  IN: 700 ml / OUT: 0 ml / NET: 700 ml    I & Os for current day (as of 20 Apr 2017 15:11)  =============================================  IN: 480 ml / OUT: 250 ml / NET: 230 ml      MEDICATIONS  (STANDING):  sodium chloride 0.9% lock flush 3milliLiter(s) IV Push every 8 hours  furosemide   Injectable 40milliGRAM(s) IV Push two times a day  gabapentin 300milliGRAM(s) Oral two times a day  allopurinol 100milliGRAM(s) Oral daily  colchicine 0.6milliGRAM(s) Oral two times a day  pantoprazole    Tablet 40milliGRAM(s) Oral before breakfast  amLODIPine   Tablet 5milliGRAM(s) Oral daily  metoprolol 50milliGRAM(s) Oral two times a day  atorvastatin 10milliGRAM(s) Oral at bedtime  ALBUTerol    0.083% 2.5milliGRAM(s) Nebulizer every 6 hours  lidocaine   Patch 1Patch Transdermal daily  guaiFENesin   Syrup  (Sugar-Free) 100milliGRAM(s) Oral every 6 hours  magnesium oxide 400milliGRAM(s) Oral three times a day with meals  lactobacillus acidophilus 1Tablet(s) Oral three times a day with meals    MEDICATIONS  (PRN):  ondansetron Injectable 4milliGRAM(s) IV Push every 6 hours PRN Nausea  cyclobenzaprine 10milliGRAM(s) Oral three times a day PRN Muscle Spasm  morphine  - Injectable 3milliGRAM(s) IV Push every 6 hours PRN BREAKTHROUGH PAIN ONLY, IF pain persists at least one hour after oral medication  oxyCODONE  5 mG/acetaminophen 325 mG 1Tablet(s) Oral every 6 hours PRN Moderate Pain (4 - 6)  oxyCODONE  5 mG/acetaminophen 325 mG 2Tablet(s) Oral every 6 hours PRN Severe Pain (7 - 10)  acetaminophen   Tablet. 650milliGRAM(s) Oral every 6 hours PRN Mild Pain (1 - 3)  loperamide 2milliGRAM(s) Oral every 4 hours PRN Diarrhea

## 2017-04-20 NOTE — PROGRESS NOTE ADULT - ASSESSMENT
76 yo M with hx Gout, CKD -3,  COPD, HTN, HLD, CHF, s/p AVR  (bio) and  CABG x1 eight years ago,  s/p open repair of AAA,  PAF on coumadin, chronic leg edema brought in by family after increasing generalized weakness, and increasing leg edema, had falls prior to admission admitted with weakness  and fall. Coumadin was stopped as per patient and family wishes due to high risk of fall.     1) Acute on chronic diastolic CHF  TTE c/w normal LV systolic function LVEF 65% normal function bioprosthetic AVR  Not on ACE-I b/o CKD.  Improved on diuretics, change lasix to PO        2) PAF: Off coumadin due to risk of fall as per patient and family wishes.   Patient and family refused AC. Refused CATHERINE reddy.  Pending family decision patient is off Anticoagulation.  Started on  mg daily.    3) CKD (chronic kidney disease) stage 3, GFR 30-59 ml/min.          4) s/p CABG and AVR (bio): Stable on medications    Will FU as needed.  Call with any questions.

## 2017-04-20 NOTE — PROGRESS NOTE ADULT - ATTENDING COMMENTS
Back pain radiating to groin: patient is on flexeril and tramadol, PRN percocet, topical lidoderm patch, patient has Hx of inguinal hernia, no signs of any obstruction or bulging on exam, pain management team is managing pain.  Per PT home PT vs LIUDMILA. However pt uses walker and has chr lymphedema of legs and family raises concerns.    Pt reported he had diarrhea, c.dif specimen is negative, lactobacillus acidophilus added.

## 2017-04-21 LAB
ANION GAP SERPL CALC-SCNC: 15 MMOL/L — SIGNIFICANT CHANGE UP (ref 5–17)
BUN SERPL-MCNC: 45 MG/DL — HIGH (ref 8–20)
C DIFF TOX GENS STL QL NAA+PROBE: SIGNIFICANT CHANGE UP
CALCIUM SERPL-MCNC: 8.8 MG/DL — SIGNIFICANT CHANGE UP (ref 8.6–10.2)
CHLORIDE SERPL-SCNC: 95 MMOL/L — LOW (ref 98–107)
CO2 SERPL-SCNC: 22 MMOL/L — SIGNIFICANT CHANGE UP (ref 22–29)
CREAT SERPL-MCNC: 1.98 MG/DL — HIGH (ref 0.5–1.3)
GLUCOSE SERPL-MCNC: 94 MG/DL — SIGNIFICANT CHANGE UP (ref 70–115)
HCT VFR BLD CALC: 31.2 % — LOW (ref 42–52)
HGB BLD-MCNC: 9.8 G/DL — LOW (ref 14–18)
INR BLD: 4.04 RATIO — HIGH (ref 0.88–1.16)
MCHC RBC-ENTMCNC: 23 PG — LOW (ref 27–31)
MCHC RBC-ENTMCNC: 31.4 G/DL — LOW (ref 32–36)
MCV RBC AUTO: 73.2 FL — LOW (ref 80–94)
PLATELET # BLD AUTO: 256 K/UL — SIGNIFICANT CHANGE UP (ref 150–400)
POTASSIUM SERPL-MCNC: 4.1 MMOL/L — SIGNIFICANT CHANGE UP (ref 3.5–5.3)
POTASSIUM SERPL-SCNC: 4.1 MMOL/L — SIGNIFICANT CHANGE UP (ref 3.5–5.3)
PROTHROM AB SERPL-ACNC: 45.7 SEC — HIGH (ref 9.8–12.7)
RBC # BLD: 4.26 M/UL — LOW (ref 4.6–6.2)
RBC # FLD: 18.8 % — HIGH (ref 11–15.6)
SODIUM SERPL-SCNC: 132 MMOL/L — LOW (ref 135–145)
WBC # BLD: 7.5 K/UL — SIGNIFICANT CHANGE UP (ref 4.8–10.8)
WBC # FLD AUTO: 7.5 K/UL — SIGNIFICANT CHANGE UP (ref 4.8–10.8)

## 2017-04-21 PROCEDURE — 76000 FLUOROSCOPY <1 HR PHYS/QHP: CPT

## 2017-04-21 PROCEDURE — 64493 INJ PARAVERT F JNT L/S 1 LEV: CPT | Mod: 50

## 2017-04-21 PROCEDURE — 99232 SBSQ HOSP IP/OBS MODERATE 35: CPT

## 2017-04-21 PROCEDURE — 99233 SBSQ HOSP IP/OBS HIGH 50: CPT

## 2017-04-21 PROCEDURE — 64494 INJ PARAVERT F JNT L/S 2 LEV: CPT | Mod: 50

## 2017-04-21 RX ORDER — PHYTONADIONE (VIT K1) 5 MG
2.5 TABLET ORAL ONCE
Qty: 0 | Refills: 0 | Status: COMPLETED | OUTPATIENT
Start: 2017-04-21 | End: 2017-04-21

## 2017-04-21 RX ADMIN — GABAPENTIN 300 MILLIGRAM(S): 400 CAPSULE ORAL at 18:10

## 2017-04-21 RX ADMIN — Medication 1 TABLET(S): at 18:10

## 2017-04-21 RX ADMIN — Medication 100 MILLIGRAM(S): at 00:03

## 2017-04-21 RX ADMIN — SODIUM CHLORIDE 3 MILLILITER(S): 9 INJECTION INTRAMUSCULAR; INTRAVENOUS; SUBCUTANEOUS at 20:55

## 2017-04-21 RX ADMIN — LIDOCAINE 1 PATCH: 4 CREAM TOPICAL at 06:05

## 2017-04-21 RX ADMIN — PANTOPRAZOLE SODIUM 40 MILLIGRAM(S): 20 TABLET, DELAYED RELEASE ORAL at 06:05

## 2017-04-21 RX ADMIN — ATORVASTATIN CALCIUM 10 MILLIGRAM(S): 80 TABLET, FILM COATED ORAL at 21:00

## 2017-04-21 RX ADMIN — Medication 50 MILLIGRAM(S): at 18:11

## 2017-04-21 RX ADMIN — ALBUTEROL 2.5 MILLIGRAM(S): 90 AEROSOL, METERED ORAL at 20:17

## 2017-04-21 RX ADMIN — ALBUTEROL 2.5 MILLIGRAM(S): 90 AEROSOL, METERED ORAL at 08:47

## 2017-04-21 RX ADMIN — SODIUM CHLORIDE 3 MILLILITER(S): 9 INJECTION INTRAMUSCULAR; INTRAVENOUS; SUBCUTANEOUS at 05:12

## 2017-04-21 RX ADMIN — Medication 40 MILLIGRAM(S): at 06:04

## 2017-04-21 RX ADMIN — Medication 2 MILLIGRAM(S): at 21:00

## 2017-04-21 RX ADMIN — Medication 325 MILLIGRAM(S): at 12:00

## 2017-04-21 RX ADMIN — SODIUM CHLORIDE 3 MILLILITER(S): 9 INJECTION INTRAMUSCULAR; INTRAVENOUS; SUBCUTANEOUS at 14:28

## 2017-04-21 RX ADMIN — MAGNESIUM OXIDE 400 MG ORAL TABLET 400 MILLIGRAM(S): 241.3 TABLET ORAL at 12:01

## 2017-04-21 RX ADMIN — Medication 100 MILLIGRAM(S): at 06:04

## 2017-04-21 RX ADMIN — MORPHINE SULFATE 3 MILLIGRAM(S): 50 CAPSULE, EXTENDED RELEASE ORAL at 08:59

## 2017-04-21 RX ADMIN — Medication 0.6 MILLIGRAM(S): at 06:04

## 2017-04-21 RX ADMIN — Medication 40 MILLIGRAM(S): at 18:10

## 2017-04-21 RX ADMIN — Medication 50 MILLIGRAM(S): at 06:04

## 2017-04-21 RX ADMIN — Medication 100 MILLIGRAM(S): at 11:59

## 2017-04-21 RX ADMIN — AMLODIPINE BESYLATE 5 MILLIGRAM(S): 2.5 TABLET ORAL at 06:04

## 2017-04-21 RX ADMIN — LIDOCAINE 1 PATCH: 4 CREAM TOPICAL at 12:06

## 2017-04-21 RX ADMIN — Medication 1 TABLET(S): at 12:01

## 2017-04-21 RX ADMIN — CYCLOBENZAPRINE HYDROCHLORIDE 10 MILLIGRAM(S): 10 TABLET, FILM COATED ORAL at 06:04

## 2017-04-21 RX ADMIN — Medication 2 MILLIGRAM(S): at 09:16

## 2017-04-21 RX ADMIN — MORPHINE SULFATE 3 MILLIGRAM(S): 50 CAPSULE, EXTENDED RELEASE ORAL at 08:42

## 2017-04-21 RX ADMIN — Medication 100 MILLIGRAM(S): at 18:10

## 2017-04-21 RX ADMIN — MAGNESIUM OXIDE 400 MG ORAL TABLET 400 MILLIGRAM(S): 241.3 TABLET ORAL at 08:42

## 2017-04-21 RX ADMIN — Medication 650 MILLIGRAM(S): at 09:20

## 2017-04-21 RX ADMIN — ALBUTEROL 2.5 MILLIGRAM(S): 90 AEROSOL, METERED ORAL at 03:14

## 2017-04-21 RX ADMIN — Medication 2.5 MILLIGRAM(S): at 12:53

## 2017-04-21 RX ADMIN — Medication 100 MILLIGRAM(S): at 12:00

## 2017-04-21 RX ADMIN — MAGNESIUM OXIDE 400 MG ORAL TABLET 400 MILLIGRAM(S): 241.3 TABLET ORAL at 18:10

## 2017-04-21 RX ADMIN — Medication 1 TABLET(S): at 08:42

## 2017-04-21 RX ADMIN — GABAPENTIN 300 MILLIGRAM(S): 400 CAPSULE ORAL at 06:04

## 2017-04-21 NOTE — PROGRESS NOTE ADULT - ASSESSMENT
78 yo M with hx Gout, CKD -3,  COPD, HTN, HLD, CHF, s/p AVR  (bio) and  CABG x1 eight years ago,  s/p open repair of AAA,  PAF on coumadin, chronic leg edema brought in by family after increasing generalized weakness, and increasing leg edema, had falls prior to admission admitted with weakness  and fall. Coumadin was stopped as per patient and family wishes due to high risk of fall.     1) Acute on chronic diastolic CHF  TTE c/w normal LV systolic function LVEF 65% normal function bioprosthetic AVR  Not on ACE-I b/o CKD.  Improved on diuretics, change lasix to PO        2) PAF: Off coumadin due to risk of fall as per patient and family wishes.   Patient and family refused AC. Refused CATHERINE reddy.  Pending family decision patient is off Anticoagulation.  Started on  mg daily.    3) CKD (chronic kidney disease) stage 3, GFR 30-59 ml/min.          4) s/p CABG and AVR (bio): Stable on medications    Will FU as needed.  Call with any questions.

## 2017-04-21 NOTE — PROGRESS NOTE ADULT - SUBJECTIVE AND OBJECTIVE BOX
Chief Complaint:    Patient seen and examined at bedside    PAST MEDICAL & SURGICAL HISTORY:  Vasculitis determined by biopsy of skin  CKD (chronic kidney disease) stage 3, GFR 30-59 ml/min  Secondary hypertension  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, unspecified site  Acute on chronic congestive heart failure, unspecified congestive heart failure type  Lumbar disc disease  Afib  Kidney disease  Neuropathy  COPD (chronic obstructive pulmonary disease)  AAA (abdominal aortic aneurysm)  Aortic valve replaced  S/P appendectomy  S/P CABG x 1  No significant past surgical history      SOCIAL HISTORY:  [ ] Denies Smoking, Alcohol, or Drug Use    Allergies    penicillins (Rash)    Intolerances        PAIN MEDICATIONS:  ondansetron Injectable 4milliGRAM(s) IV Push every 6 hours PRN  gabapentin 300milliGRAM(s) Oral two times a day  cyclobenzaprine 10milliGRAM(s) Oral three times a day PRN  morphine  - Injectable 3milliGRAM(s) IV Push every 6 hours PRN  oxyCODONE  5 mG/acetaminophen 325 mG 1Tablet(s) Oral every 6 hours PRN  oxyCODONE  5 mG/acetaminophen 325 mG 2Tablet(s) Oral every 6 hours PRN  acetaminophen   Tablet. 650milliGRAM(s) Oral every 6 hours PRN  aspirin 325milliGRAM(s) Oral daily    Heme:    Antibiotics:    Cardiac:  amLODIPine   Tablet 5milliGRAM(s) Oral daily  metoprolol 50milliGRAM(s) Oral two times a day  furosemide    Tablet 40milliGRAM(s) Oral two times a day    Pulmonary:  ALBUTerol    0.083% 2.5milliGRAM(s) Nebulizer every 6 hours  guaiFENesin   Syrup  (Sugar-Free) 100milliGRAM(s) Oral every 6 hours    Endocrine  allopurinol 100milliGRAM(s) Oral daily  colchicine 0.6milliGRAM(s) Oral two times a day  atorvastatin 10milliGRAM(s) Oral at bedtime    GI:  pantoprazole    Tablet 40milliGRAM(s) Oral before breakfast  loperamide 2milliGRAM(s) Oral every 4 hours PRN    All Other Meds:  lidocaine   Patch 1Patch Transdermal daily  magnesium oxide 400milliGRAM(s) Oral three times a day with meals  lactobacillus acidophilus 1Tablet(s) Oral three times a day with meals  phytonadione   Solution 2.5milliGRAM(s) Oral once      REVIEW OF SYSTEMS:  CONSTITUTIONAL: Negative fever,chills  NECK: No pain or stiffness  RESPIRATORY: No cough, wheezing,  No shortness of breath  GASTROINTESTINAL: No abdominal, No nausea, vomiting; No diarrhea or constipation.   GENITOURINARY: No dysuria, frequency, or incontinence  NEUROLOGICAL: No headaches, memory loss, loss of strength, numbness, or  SKIN: No itching, burning, rashes, or lesions   MUSCULOSKELETAL: No joint pain or swelling; No muscle, back, or extremity pain    PHYSICAL EXAM:    Vital Signs Last 24 Hrs  T(C): 36.1, Max: 36.3 (04-20 @ 15:00)  T(F): 97, Max: 97.3 (04-20 @ 15:00)  HR: 72 (64 - 79)  BP: 132/63 (122/60 - 143/65)  BP(mean): --  RR: 17 (16 - 17)  SpO2: 99% (91% - 99%)    PAIN SCORE:         SCALE USED: (1-10 VNRS)       GENERAL: Comfortable, in no apparent distress  HEENT:  Atraumatic, Normocephalic, PERRL, EOMI  NECK: Supple  LUNG: Respiration even and unlabored, no distress noted  ABDOMEN: Soft, Nontender, Nondistended; Dressing C/D/I  BACK: No midline bony tenderness to palpation, no step off or deformity noted.   EXTREMITIES:  HARDIN X 4; 2+ Peripheral Pulses, No clubbing, cyanosis, or edema  NEUROLOGIC: Awake, alert and oriented X3;  No focal deficits. Motor strength 5/5. Sensation intact to light touch  SKIN: Warm and Dry    LABS:                          9.8    7.5   )-----------( 256      ( 21 Apr 2017 06:59 )             31.2     04-21    132<L>  |  95<L>  |  45.0<H>  ----------------------------<  94  4.1   |  22.0  |  1.98<H>    Ca    8.8      21 Apr 2017 06:59  Phos  3.0     04-20  Mg     1.8     04-20      PT/INR - ( 21 Apr 2017 06:59 )   PT: 45.7 sec;   INR: 4.04 ratio               Drug Screen:        RADIOLOGY:      [ ]  [place]  Reviewed and Copied to Chart Chief Complaint: Chronic Pain Syndrome     Patient seen and examined at bedside, sitting up in chair  Well maintained with the current medication regimen, improved relief  No adverse side effects  Diet as tolerated    PAST MEDICAL & SURGICAL HISTORY:  Vasculitis determined by biopsy of skin  CKD (chronic kidney disease) stage 3, GFR 30-59 ml/min  Secondary hypertension  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, unspecified site  Acute on chronic congestive heart failure, unspecified congestive heart failure type  Lumbar disc disease  Afib  Kidney disease  Neuropathy  COPD (chronic obstructive pulmonary disease)  AAA (abdominal aortic aneurysm)  Aortic valve replaced  S/P appendectomy  S/P CABG x 1  No significant past surgical history      SOCIAL HISTORY:  [ ] Denies Smoking, Alcohol, or Drug Use    Allergies    penicillins (Rash)    Intolerances        PAIN MEDICATIONS:  ondansetron Injectable 4milliGRAM(s) IV Push every 6 hours PRN  gabapentin 300milliGRAM(s) Oral two times a day  cyclobenzaprine 10milliGRAM(s) Oral three times a day PRN  morphine  - Injectable 3milliGRAM(s) IV Push every 6 hours PRN  oxyCODONE  5 mG/acetaminophen 325 mG 1Tablet(s) Oral every 6 hours PRN  oxyCODONE  5 mG/acetaminophen 325 mG 2Tablet(s) Oral every 6 hours PRN  acetaminophen   Tablet. 650milliGRAM(s) Oral every 6 hours PRN  aspirin 325milliGRAM(s) Oral daily    Heme:    Antibiotics:    Cardiac:  amLODIPine   Tablet 5milliGRAM(s) Oral daily  metoprolol 50milliGRAM(s) Oral two times a day  furosemide    Tablet 40milliGRAM(s) Oral two times a day    Pulmonary:  ALBUTerol    0.083% 2.5milliGRAM(s) Nebulizer every 6 hours  guaiFENesin   Syrup  (Sugar-Free) 100milliGRAM(s) Oral every 6 hours    Endocrine  allopurinol 100milliGRAM(s) Oral daily  colchicine 0.6milliGRAM(s) Oral two times a day  atorvastatin 10milliGRAM(s) Oral at bedtime    GI:  pantoprazole    Tablet 40milliGRAM(s) Oral before breakfast  loperamide 2milliGRAM(s) Oral every 4 hours PRN    All Other Meds:  lidocaine   Patch 1Patch Transdermal daily  magnesium oxide 400milliGRAM(s) Oral three times a day with meals  lactobacillus acidophilus 1Tablet(s) Oral three times a day with meals  phytonadione   Solution 2.5milliGRAM(s) Oral once      REVIEW OF SYSTEMS:  CONSTITUTIONAL: Negative fever, chills  NECK: No pain or stiffness  RESPIRATORY: No cough, wheezing,  No shortness of breath  GASTROINTESTINAL: No abdominal, No nausea, vomiting; No diarrhea or constipation.   GENITOURINARY: No dysuria, frequency, or incontinence  NEUROLOGICAL: No headaches, memory loss, loss of strength, numbness, or  SKIN: No itching, burning, rashes, or lesions   MUSCULOSKELETAL: + joint pain or swelling; + muscle, back, extremity pain    PHYSICAL EXAM:    Vital Signs Last 24 Hrs  T(C): 36.1, Max: 36.3 (04-20 @ 15:00)  T(F): 97, Max: 97.3 (04-20 @ 15:00)  HR: 72 (64 - 79)  BP: 132/63 (122/60 - 143/65)  BP(mean): --  RR: 17 (16 - 17)  SpO2: 99% (91% - 99%)    PAIN SCORE:         SCALE USED: (1-10 VNRS)       GENERAL: Comfortable, in no apparent distress  HEENT:  Atraumatic, Normocephalic  NECK: Supple  LUNG: Respiration even and unlabored, no distress noted  ABDOMEN: Soft, Nontender, Nondistended  BACK: No midline bony tenderness to palpation, no step off or deformity noted.   EXTREMITIES:  HARDIN X 4; + edema  NEUROLOGIC: Awake, alert and oriented X3  SKIN: Warm and Dry    LABS:                          9.8    7.5   )-----------( 256      ( 21 Apr 2017 06:59 )             31.2     04-21    132<L>  |  95<L>  |  45.0<H>  ----------------------------<  94  4.1   |  22.0  |  1.98<H>    Ca    8.8      21 Apr 2017 06:59  Phos  3.0     04-20  Mg     1.8     04-20      PT/INR - ( 21 Apr 2017 06:59 )   PT: 45.7 sec;   INR: 4.04 ratio               Drug Screen:        RADIOLOGY:      [ ]  [place]  Reviewed and Copied to Chart

## 2017-04-21 NOTE — PROGRESS NOTE ADULT - ATTENDING COMMENTS
Back pain radiating to groin: patient is on flexeril and tramadol, PRN percocet, topical lidoderm patch, patient has Hx of inguinal hernia, no signs of any obstruction or bulging on exam, pain management team is managing pain.  Per PT home PT vs LIUDMILA. However pt uses walker and has chr lymphedema of legs.   Pt reported he had diarrhea, c.dif specimen is negative, lactobacillus acidophilus added, improving diarrhea. Back pain radiating to groin: patient is on flexeril and tramadol, PRN percocet, topical lidoderm patch, patient has Hx of inguinal hernia, no signs of any obstruction or bulging on exam, pain management team is managing pain.  Per PT home PT vs LIUDMILA. However pt uses walker and has chr lymphedema of legs,  is working on the case.   Pt reported he had diarrhea, c.dif specimen is negative, lactobacillus acidophilus added, improving diarrhea.

## 2017-04-21 NOTE — PROGRESS NOTE ADULT - SUBJECTIVE AND OBJECTIVE BOX
Renal :                                                                                                        Subjective: feels better, sitting in the chair, denied CP, dyspnea    Vitals:  T(C): 36.3, Max: 36.9 (04-20 @ 11:10)  HR: 79 (66 - 79)  BP: 122/60 (118/66 - 134/56)  RR: 16 (16 - 20)  SpO2: 96% (91% - 99%)  Wt(kg): --  I&O's Summary  I & Os for 24h ending 20 Apr 2017 07:00  =============================================  IN: 700 ml / OUT: 0 ml / NET: 700 ml    I & Os for current day (as of 20 Apr 2017 19:05)  =============================================  IN: 480 ml / OUT: 300 ml / NET: 180 ml        PHYSICAL EXAM:  Appearance: Normal	  HEENT:   Atraumatic  Cardiovascular: +S1 S2, No JVD, No murmurs,   Respiratory: Lungs clear to auscultation	  Gastrointestinal:  Soft, Non-tender, + BS	  Skin: No rashes, No ecchymoses, No cyanosis  Neurologic: Alert and awake, able to move extremities  Extremities: bilateral lower extemity edema with chronic stasis dermatitis changes        CURRENT MEDICATIONS:  amLODIPine   Tablet 5milliGRAM(s) Oral daily  metoprolol 50milliGRAM(s) Oral two times a day  furosemide    Tablet 40milliGRAM(s) Oral two times a day      ALBUTerol    0.083% 2.5milliGRAM(s) Nebulizer every 6 hours  guaiFENesin   Syrup  (Sugar-Free) 100milliGRAM(s) Oral every 6 hours    ondansetron Injectable 4milliGRAM(s) IV Push every 6 hours PRN  gabapentin 300milliGRAM(s) Oral two times a day  cyclobenzaprine 10milliGRAM(s) Oral three times a day PRN  morphine  - Injectable 3milliGRAM(s) IV Push every 6 hours PRN  oxyCODONE  5 mG/acetaminophen 325 mG 1Tablet(s) Oral every 6 hours PRN  oxyCODONE  5 mG/acetaminophen 325 mG 2Tablet(s) Oral every 6 hours PRN  acetaminophen   Tablet. 650milliGRAM(s) Oral every 6 hours PRN  aspirin 325milliGRAM(s) Oral daily    pantoprazole    Tablet 40milliGRAM(s) Oral before breakfast  loperamide 2milliGRAM(s) Oral every 4 hours PRN    allopurinol 100milliGRAM(s) Oral daily  colchicine 0.6milliGRAM(s) Oral two times a day  atorvastatin 10milliGRAM(s) Oral at bedtime    lidocaine   Patch 1Patch Transdermal daily  magnesium oxide 400milliGRAM(s) Oral three times a day with meals      LABS:	 	                            9.7    6.4   )-----------( 255      ( 20 Apr 2017 06:44 )             30.6     04-20    131<L>  |  95<L>  |  44.0<H>  ----------------------------<  98  3.5   |  19.0<L>  |  1.98<H>    Ca    8.8      20 Apr 2017 06:44  Phos  3.0     04-20  Mg     1.8     04-20        nsetron Injectable 4milliGRAM(s) IV Push every 6 hours PRN Nausea  cyclobenzaprine 10milliGRAM(s) Oral three times a day PRN Muscle Spasm  morphine  - Injectable 3milliGRAM(s) IV Push every 6 hours PRN BREAKTHROUGH PAIN ONLY, IF pain persists at least one hour after oral medication  oxyCODONE  5 mG/acetaminophen 325 mG 1Tablet(s) Oral every 6 hours PRN Moderate Pain (4 - 6)  oxyCODONE  5 mG/acetaminophen 325 mG 2Tablet(s) Oral every 6 hours PRN Severe Pain (7 - 10)  acetaminophen   Tablet. 650milliGRAM(s) Oral every 6 hours PRN Mild Pain (1 - 3)  loperamide 2milliGRAM(s) Oral every 4 hours PRN Diarrhea

## 2017-04-21 NOTE — PROGRESS NOTE ADULT - PROBLEM SELECTOR PLAN 6
Discussed with pt, wife, and daughter lebron, they do not want pt on coumadin. Cardiology team updated, INR is 4, will reverse it.

## 2017-04-22 LAB
ANION GAP SERPL CALC-SCNC: 15 MMOL/L — SIGNIFICANT CHANGE UP (ref 5–17)
BUN SERPL-MCNC: 48 MG/DL — HIGH (ref 8–20)
CALCIUM SERPL-MCNC: 8.8 MG/DL — SIGNIFICANT CHANGE UP (ref 8.6–10.2)
CHLORIDE SERPL-SCNC: 96 MMOL/L — LOW (ref 98–107)
CO2 SERPL-SCNC: 22 MMOL/L — SIGNIFICANT CHANGE UP (ref 22–29)
CREAT SERPL-MCNC: 2.2 MG/DL — HIGH (ref 0.5–1.3)
GLUCOSE SERPL-MCNC: 90 MG/DL — SIGNIFICANT CHANGE UP (ref 70–115)
HCT VFR BLD CALC: 29.2 % — LOW (ref 42–52)
HGB BLD-MCNC: 9.1 G/DL — LOW (ref 14–18)
INR BLD: 2.11 RATIO — HIGH (ref 0.88–1.16)
MCHC RBC-ENTMCNC: 23.1 PG — LOW (ref 27–31)
MCHC RBC-ENTMCNC: 31.2 G/DL — LOW (ref 32–36)
MCV RBC AUTO: 74.1 FL — LOW (ref 80–94)
PLATELET # BLD AUTO: 266 K/UL — SIGNIFICANT CHANGE UP (ref 150–400)
POTASSIUM SERPL-MCNC: 3.9 MMOL/L — SIGNIFICANT CHANGE UP (ref 3.5–5.3)
POTASSIUM SERPL-SCNC: 3.9 MMOL/L — SIGNIFICANT CHANGE UP (ref 3.5–5.3)
PROTHROM AB SERPL-ACNC: 23.6 SEC — HIGH (ref 9.8–12.7)
RBC # BLD: 3.94 M/UL — LOW (ref 4.6–6.2)
RBC # FLD: 18.7 % — HIGH (ref 11–15.6)
SODIUM SERPL-SCNC: 133 MMOL/L — LOW (ref 135–145)
WBC # BLD: 8.3 K/UL — SIGNIFICANT CHANGE UP (ref 4.8–10.8)
WBC # FLD AUTO: 8.3 K/UL — SIGNIFICANT CHANGE UP (ref 4.8–10.8)

## 2017-04-22 PROCEDURE — 99232 SBSQ HOSP IP/OBS MODERATE 35: CPT

## 2017-04-22 RX ORDER — HYDROCORTISONE 1 %
1 OINTMENT (GRAM) TOPICAL
Qty: 0 | Refills: 0 | Status: DISCONTINUED | OUTPATIENT
Start: 2017-04-22 | End: 2017-04-23

## 2017-04-22 RX ORDER — MORPHINE SULFATE 50 MG/1
3 CAPSULE, EXTENDED RELEASE ORAL EVERY 6 HOURS
Qty: 0 | Refills: 0 | Status: DISCONTINUED | OUTPATIENT
Start: 2017-04-22 | End: 2017-04-23

## 2017-04-22 RX ORDER — NYSTATIN CREAM 100000 [USP'U]/G
1 CREAM TOPICAL
Qty: 0 | Refills: 0 | Status: DISCONTINUED | OUTPATIENT
Start: 2017-04-22 | End: 2017-04-23

## 2017-04-22 RX ORDER — PHENYLEPHRINE-SHARK LIVER OIL-MINERAL OIL-PETROLATUM RECTAL OINTMENT
1 OINTMENT (GRAM) RECTAL
Qty: 0 | Refills: 0 | Status: DISCONTINUED | OUTPATIENT
Start: 2017-04-22 | End: 2017-04-22

## 2017-04-22 RX ADMIN — ALBUTEROL 2.5 MILLIGRAM(S): 90 AEROSOL, METERED ORAL at 08:32

## 2017-04-22 RX ADMIN — Medication 1 TABLET(S): at 17:36

## 2017-04-22 RX ADMIN — AMLODIPINE BESYLATE 5 MILLIGRAM(S): 2.5 TABLET ORAL at 05:23

## 2017-04-22 RX ADMIN — ATORVASTATIN CALCIUM 10 MILLIGRAM(S): 80 TABLET, FILM COATED ORAL at 22:37

## 2017-04-22 RX ADMIN — Medication 1 SUPPOSITORY(S): at 17:37

## 2017-04-22 RX ADMIN — Medication 0.6 MILLIGRAM(S): at 17:37

## 2017-04-22 RX ADMIN — Medication 50 MILLIGRAM(S): at 05:23

## 2017-04-22 RX ADMIN — SODIUM CHLORIDE 3 MILLILITER(S): 9 INJECTION INTRAMUSCULAR; INTRAVENOUS; SUBCUTANEOUS at 13:06

## 2017-04-22 RX ADMIN — Medication 100 MILLIGRAM(S): at 12:37

## 2017-04-22 RX ADMIN — Medication 2 MILLIGRAM(S): at 22:37

## 2017-04-22 RX ADMIN — Medication 325 MILLIGRAM(S): at 12:36

## 2017-04-22 RX ADMIN — Medication 100 MILLIGRAM(S): at 12:36

## 2017-04-22 RX ADMIN — Medication 1 TABLET(S): at 12:35

## 2017-04-22 RX ADMIN — Medication 100 MILLIGRAM(S): at 05:23

## 2017-04-22 RX ADMIN — ALBUTEROL 2.5 MILLIGRAM(S): 90 AEROSOL, METERED ORAL at 14:24

## 2017-04-22 RX ADMIN — LIDOCAINE 1 PATCH: 4 CREAM TOPICAL at 12:36

## 2017-04-22 RX ADMIN — Medication 2 MILLIGRAM(S): at 17:38

## 2017-04-22 RX ADMIN — MAGNESIUM OXIDE 400 MG ORAL TABLET 400 MILLIGRAM(S): 241.3 TABLET ORAL at 12:36

## 2017-04-22 RX ADMIN — Medication 100 MILLIGRAM(S): at 22:38

## 2017-04-22 RX ADMIN — Medication 40 MILLIGRAM(S): at 05:23

## 2017-04-22 RX ADMIN — GABAPENTIN 300 MILLIGRAM(S): 400 CAPSULE ORAL at 05:23

## 2017-04-22 RX ADMIN — Medication 2 MILLIGRAM(S): at 13:09

## 2017-04-22 RX ADMIN — Medication 100 MILLIGRAM(S): at 17:37

## 2017-04-22 RX ADMIN — MAGNESIUM OXIDE 400 MG ORAL TABLET 400 MILLIGRAM(S): 241.3 TABLET ORAL at 08:36

## 2017-04-22 RX ADMIN — SODIUM CHLORIDE 3 MILLILITER(S): 9 INJECTION INTRAMUSCULAR; INTRAVENOUS; SUBCUTANEOUS at 05:25

## 2017-04-22 RX ADMIN — ALBUTEROL 2.5 MILLIGRAM(S): 90 AEROSOL, METERED ORAL at 20:59

## 2017-04-22 RX ADMIN — ALBUTEROL 2.5 MILLIGRAM(S): 90 AEROSOL, METERED ORAL at 04:08

## 2017-04-22 RX ADMIN — PANTOPRAZOLE SODIUM 40 MILLIGRAM(S): 20 TABLET, DELAYED RELEASE ORAL at 05:23

## 2017-04-22 RX ADMIN — NYSTATIN CREAM 1 APPLICATION(S): 100000 CREAM TOPICAL at 17:36

## 2017-04-22 RX ADMIN — SODIUM CHLORIDE 3 MILLILITER(S): 9 INJECTION INTRAMUSCULAR; INTRAVENOUS; SUBCUTANEOUS at 22:30

## 2017-04-22 RX ADMIN — Medication 50 MILLIGRAM(S): at 17:38

## 2017-04-22 RX ADMIN — Medication 40 MILLIGRAM(S): at 17:36

## 2017-04-22 RX ADMIN — Medication 100 MILLIGRAM(S): at 05:25

## 2017-04-22 RX ADMIN — MAGNESIUM OXIDE 400 MG ORAL TABLET 400 MILLIGRAM(S): 241.3 TABLET ORAL at 17:36

## 2017-04-22 RX ADMIN — Medication 1 TABLET(S): at 08:36

## 2017-04-22 RX ADMIN — LIDOCAINE 1 PATCH: 4 CREAM TOPICAL at 00:00

## 2017-04-22 RX ADMIN — Medication 2 MILLIGRAM(S): at 05:23

## 2017-04-22 RX ADMIN — GABAPENTIN 300 MILLIGRAM(S): 400 CAPSULE ORAL at 17:36

## 2017-04-22 NOTE — PROGRESS NOTE ADULT - PROBLEM SELECTOR PROBLEM 1
Acute on chronic congestive heart failure, unspecified congestive heart failure type
CKD (chronic kidney disease) stage 3, GFR 30-59 ml/min
CKD (chronic kidney disease) stage 3, GFR 30-59 ml/min
Lumbar disc disease

## 2017-04-22 NOTE — PROGRESS NOTE ADULT - PROBLEM SELECTOR PLAN 1
Continue telemetry, echo done showed prior AVR in normal position with normal gradient, EF in 60 to 65%, no wall motion,  No ACE-I as creatinine is around 2, do not know the baseline, will continue to monitor. Cardiology is on board, Cont. BB, Norvasc, DVT-P, fall risk protocol, IV lasix has been changed to po lasix, kidney function has been stable, will continue with current management.
Continue telemetry, echo done showed prior AVR in normal position with normal gradient, EF in 60 to 65%, no wall motion,  No ACE-I as creatinine is around 2, do not know the baseline, will continue to monitor. Cardiology is on board, Cont. BB, Norvasc, DVT-P, fall risk protocol, IV lasix has been changed to po lasix, kidney function has been stable.
Continue telemetry, echo done showed prior AVR in normal position with normal gradient, EF in 60 to 65%, no wall motion, being continued on IV lasix. No ACE-I, renal function is better, creatinine around 2, do not know the baseline, will continue to monitor. Cardiology is on board, Cont. BB, Norvasc, DVT-P, fall risk protocol.
Improving, Trend BUN/Cr, lytes, avoid nephrotoxins, renally dose meds, will continue to monitor BMP.
Patient has acute on chronic diastolic CHF: Continue telemetry, echo done showed prior AVR in normal position with normal gradient, EF in 60 to 65%, no wall motion, being Continues on IV lasix, no ACE-I renal function is improving, will resume ACEIs once kidney function is stable,  baseline unknown. Cardiology is on board, Cont. BB, Norvasc,  DVT-P, fall risk protocol.
Patient has acute on chronic diastolic CHF: Continue telemetry, echo done showed prior AVR in normal position with normal gradient, EF in 60 to 65%, no wall motion, being continued on IV lasix. No ACE-I, renal function has been little off from the baseline, will continue to monitor. Cardiology is on board, Cont. BB, Norvasc, DVT-P, fall risk protocol.
Teleemtry, f/u echo to asses prior AVR, EF, wall motion. Cont. IV lasix, no ACE-I with worsening renal failure. baseline unknown. Cardiology noted  Cont. BB, Norvasc. DVT-P, fall risk protocol,
Teleemtry, f/u echo to asses prior AVR, EF, wall motion. Cont. IV lasix, no ACE-I with worsening renal failure. baseline unknown. Cardiology noted  Cont. BB, Norvasc. DVT-P, fall risk protocol, PT eval
continue teleemtery, echo done showed prior AVR in in normal position with normal gradient, EF in 60 to 65%, no wall motion, being Continues on IV lasix, no ACE-I with worsening renal failure. baseline unknown. Cardiology noted  Cont. BB, Norvasc. DVT-P, fall risk protocol.
continue telemtery, echo done showed prior AVR in normal position with normal gradient, EF in 60 to 65%, no wall motion, being Continues on IV lasix, no ACE-I renal function is improving, will resume ACEIs once kidney function is better,  baseline unknown. Cardiology is on board, Cont. THOMAS, Norvasc. DVT-P, fall risk protocol.
1-Patient is stable, pain is controlled  2-Continue current medication regimen without changes  3-Will continue to follow as needed-  4-Thank You
Optimized

## 2017-04-22 NOTE — PROGRESS NOTE ADULT - PROBLEM SELECTOR PLAN 6
Discussed with pts wife, and daughter lebron, they do not want pt on coumadin. Cardiology team updated, INR is reversing toward normal.

## 2017-04-22 NOTE — PROGRESS NOTE ADULT - ATTENDING COMMENTS
Back pain radiating to groin: patient is on flexeril and tramadol, PRN percocet, topical lidoderm patch, patient has Hx of inguinal hernia, no signs of any obstruction or bulging on exam, pain management team is managing pain.  Per PT home PT vs LIUDMILA. However pt uses walker and has chr lymphedema of legs,  is working on the case.   Pt reported he had diarrhea, c.dif specimen is negative, lactobacillus acidophilus added, had 1 bowel movement today.   Hemorrhoidal pain: will start patient on hemorrhoidal cream, will monitor.

## 2017-04-22 NOTE — PROGRESS NOTE ADULT - PROVIDER SPECIALTY LIST ADULT
Cardiology
Hospitalist
Internal Medicine
Internal Medicine
Nephrology
Nephrology
Pain Medicine
Nephrology

## 2017-04-22 NOTE — PROGRESS NOTE ADULT - PROBLEM SELECTOR PROBLEM 8
Influenza B

## 2017-04-22 NOTE — PROGRESS NOTE ADULT - ASSESSMENT
78 y/o male with acute on chronic CHF of unclear etiology, Hx of Afib, HLD, HTN, Neuropathy, AVR, COPD, CKD-3, CAD s/p CABG, Gout, Chronic back pain admitted with acute chf with preserved EF. no sys or diastolic dysfunction on ECHO. was on coumadin, initially family decided to stop coumadin d/t fall risk, then they had a discussion with cardiologist and would like to resume coumadin, CHF improved with IV lasix. Influenza B + renally adjusted tamiflu completed

## 2017-04-22 NOTE — PROGRESS NOTE ADULT - PROBLEM SELECTOR PLAN 2
Improving, Trend BUN/Cr, lytes, avoid nephrotoxins, renally dose meds, will continue to monitor BMP.
Patient has acute on chronic diastolic CHF: Continue telemetry, echo done showed prior AVR in normal position with normal gradient, EF in 60 to 65%, no wall motion, being Continues on IV lasix, no ACE-I & renal function is improving, will resume ACEIs once kidney function is stable,  baseline unknown. Cont. THOMAS, Norvasc,
Trend BUN/Cr, lytes, avoid nephrotoxins, renally dose meds.
creatinine 1.98. will continue to Trend BUN/Cr, lytes, avoid nephrotoxins, renally dose meds, will continue to monitor BMP.
creatinine 1.98. will continue to Trend BUN/Cr, lytes, avoid nephrotoxins, renally dose meds, will continue to monitor BMP.
has been a little of baseline during this stay, will continue to Trend BUN/Cr, lytes, avoid nephrotoxins, renally dose meds, will continue to monitor BMP.
improving, Trend BUN/Cr, lytes, avoid nephrotoxins, renally dose meds, will continue to monitor BMP.
improving. Trend BUN/Cr, lytes, avoid nephrotoxins, renally dose meds.
improving. Trend BUN/Cr, lytes, avoid nephrotoxins, renally dose meds.
is improving, creatinine 2. will continue to Trend BUN/Cr, lytes, avoid nephrotoxins, renally dose meds, will continue to monitor BMP.
Compensated,

## 2017-04-22 NOTE — PROGRESS NOTE ADULT - PROBLEM SELECTOR PROBLEM 2
Acute on chronic congestive heart failure, unspecified congestive heart failure type
CKD (chronic kidney disease) stage 3, GFR 30-59 ml/min
Acute on chronic congestive heart failure, unspecified congestive heart failure type

## 2017-04-23 ENCOUNTER — TRANSCRIPTION ENCOUNTER (OUTPATIENT)
Age: 77
End: 2017-04-23

## 2017-04-23 VITALS
RESPIRATION RATE: 16 BRPM | SYSTOLIC BLOOD PRESSURE: 128 MMHG | OXYGEN SATURATION: 95 % | TEMPERATURE: 98 F | DIASTOLIC BLOOD PRESSURE: 58 MMHG | HEART RATE: 82 BPM

## 2017-04-23 PROBLEM — Z79.01 ANTICOAGULATION MONITORING, INR RANGE 2-3: Status: ACTIVE | Noted: 2017-04-23

## 2017-04-23 PROBLEM — D69.2 PURPURA: Status: ACTIVE | Noted: 2017-04-23

## 2017-04-23 LAB
ANION GAP SERPL CALC-SCNC: 17 MMOL/L — SIGNIFICANT CHANGE UP (ref 5–17)
BUN SERPL-MCNC: 45 MG/DL — HIGH (ref 8–20)
CALCIUM SERPL-MCNC: 8.5 MG/DL — LOW (ref 8.6–10.2)
CHLORIDE SERPL-SCNC: 97 MMOL/L — LOW (ref 98–107)
CO2 SERPL-SCNC: 20 MMOL/L — LOW (ref 22–29)
CREAT SERPL-MCNC: 1.86 MG/DL — HIGH (ref 0.5–1.3)
GLUCOSE SERPL-MCNC: 94 MG/DL — SIGNIFICANT CHANGE UP (ref 70–115)
HCT VFR BLD CALC: 28.1 % — LOW (ref 42–52)
HGB BLD-MCNC: 8.8 G/DL — LOW (ref 14–18)
INR BLD: 1.59 RATIO — HIGH (ref 0.88–1.16)
MCHC RBC-ENTMCNC: 23.1 PG — LOW (ref 27–31)
MCHC RBC-ENTMCNC: 31.3 G/DL — LOW (ref 32–36)
MCV RBC AUTO: 73.8 FL — LOW (ref 80–94)
PLATELET # BLD AUTO: 243 K/UL — SIGNIFICANT CHANGE UP (ref 150–400)
POTASSIUM SERPL-MCNC: 3.7 MMOL/L — SIGNIFICANT CHANGE UP (ref 3.5–5.3)
POTASSIUM SERPL-SCNC: 3.7 MMOL/L — SIGNIFICANT CHANGE UP (ref 3.5–5.3)
PROTHROM AB SERPL-ACNC: 17.7 SEC — HIGH (ref 9.8–12.7)
RBC # BLD: 3.81 M/UL — LOW (ref 4.6–6.2)
RBC # FLD: 18.7 % — HIGH (ref 11–15.6)
SODIUM SERPL-SCNC: 134 MMOL/L — LOW (ref 135–145)
WBC # BLD: 8.9 K/UL — SIGNIFICANT CHANGE UP (ref 4.8–10.8)
WBC # FLD AUTO: 8.9 K/UL — SIGNIFICANT CHANGE UP (ref 4.8–10.8)

## 2017-04-23 PROCEDURE — 85730 THROMBOPLASTIN TIME PARTIAL: CPT

## 2017-04-23 PROCEDURE — 71045 X-RAY EXAM CHEST 1 VIEW: CPT

## 2017-04-23 PROCEDURE — 84100 ASSAY OF PHOSPHORUS: CPT

## 2017-04-23 PROCEDURE — 87493 C DIFF AMPLIFIED PROBE: CPT

## 2017-04-23 PROCEDURE — 99239 HOSP IP/OBS DSCHRG MGMT >30: CPT

## 2017-04-23 PROCEDURE — 87798 DETECT AGENT NOS DNA AMP: CPT

## 2017-04-23 PROCEDURE — 84443 ASSAY THYROID STIM HORMONE: CPT

## 2017-04-23 PROCEDURE — 96374 THER/PROPH/DIAG INJ IV PUSH: CPT

## 2017-04-23 PROCEDURE — 83735 ASSAY OF MAGNESIUM: CPT

## 2017-04-23 PROCEDURE — 94640 AIRWAY INHALATION TREATMENT: CPT

## 2017-04-23 PROCEDURE — 97530 THERAPEUTIC ACTIVITIES: CPT

## 2017-04-23 PROCEDURE — 87581 M.PNEUMON DNA AMP PROBE: CPT

## 2017-04-23 PROCEDURE — 36415 COLL VENOUS BLD VENIPUNCTURE: CPT

## 2017-04-23 PROCEDURE — 87486 CHLMYD PNEUM DNA AMP PROBE: CPT

## 2017-04-23 PROCEDURE — 93306 TTE W/DOPPLER COMPLETE: CPT

## 2017-04-23 PROCEDURE — 97116 GAIT TRAINING THERAPY: CPT

## 2017-04-23 PROCEDURE — 93005 ELECTROCARDIOGRAM TRACING: CPT

## 2017-04-23 PROCEDURE — 96375 TX/PRO/DX INJ NEW DRUG ADDON: CPT

## 2017-04-23 PROCEDURE — 87633 RESP VIRUS 12-25 TARGETS: CPT

## 2017-04-23 PROCEDURE — 82553 CREATINE MB FRACTION: CPT

## 2017-04-23 PROCEDURE — 70450 CT HEAD/BRAIN W/O DYE: CPT

## 2017-04-23 PROCEDURE — 97110 THERAPEUTIC EXERCISES: CPT

## 2017-04-23 PROCEDURE — 85027 COMPLETE CBC AUTOMATED: CPT

## 2017-04-23 PROCEDURE — 84484 ASSAY OF TROPONIN QUANT: CPT

## 2017-04-23 PROCEDURE — 83880 ASSAY OF NATRIURETIC PEPTIDE: CPT

## 2017-04-23 PROCEDURE — 85610 PROTHROMBIN TIME: CPT

## 2017-04-23 PROCEDURE — 82550 ASSAY OF CK (CPK): CPT

## 2017-04-23 PROCEDURE — 80048 BASIC METABOLIC PNL TOTAL CA: CPT

## 2017-04-23 PROCEDURE — 99285 EMERGENCY DEPT VISIT HI MDM: CPT | Mod: 25

## 2017-04-23 PROCEDURE — 94760 N-INVAS EAR/PLS OXIMETRY 1: CPT

## 2017-04-23 PROCEDURE — 80053 COMPREHEN METABOLIC PANEL: CPT

## 2017-04-23 PROCEDURE — 97163 PT EVAL HIGH COMPLEX 45 MIN: CPT

## 2017-04-23 RX ORDER — FUROSEMIDE 40 MG
2 TABLET ORAL
Qty: 0 | Refills: 0 | COMMUNITY

## 2017-04-23 RX ORDER — LACTOBACILLUS ACIDOPHILUS 100MM CELL
1 CAPSULE ORAL
Qty: 0 | Refills: 0 | COMMUNITY
Start: 2017-04-23

## 2017-04-23 RX ORDER — METOPROLOL TARTRATE 50 MG
1 TABLET ORAL
Qty: 0 | Refills: 0 | COMMUNITY

## 2017-04-23 RX ORDER — ERGOCALCIFEROL 1.25 MG/1
2000 CAPSULE ORAL
Qty: 0 | Refills: 0 | COMMUNITY

## 2017-04-23 RX ORDER — METOPROLOL TARTRATE 50 MG
1 TABLET ORAL
Qty: 0 | Refills: 0 | COMMUNITY
Start: 2017-04-23

## 2017-04-23 RX ORDER — ATORVASTATIN CALCIUM 80 MG/1
1 TABLET, FILM COATED ORAL
Qty: 0 | Refills: 0 | COMMUNITY
Start: 2017-04-23

## 2017-04-23 RX ORDER — ERGOCALCIFEROL 1.25 MG/1
0 CAPSULE ORAL
Qty: 0 | Refills: 0 | COMMUNITY

## 2017-04-23 RX ORDER — HYDROCORTISONE 1 %
1 OINTMENT (GRAM) TOPICAL
Qty: 0 | Refills: 0 | COMMUNITY
Start: 2017-04-23

## 2017-04-23 RX ORDER — POTASSIUM CHLORIDE 20 MEQ
0 PACKET (EA) ORAL
Qty: 0 | Refills: 0 | COMMUNITY

## 2017-04-23 RX ORDER — OMEPRAZOLE 10 MG/1
1 CAPSULE, DELAYED RELEASE ORAL
Qty: 0 | Refills: 0 | COMMUNITY

## 2017-04-23 RX ORDER — SIMVASTATIN 20 MG/1
1 TABLET, FILM COATED ORAL
Qty: 0 | Refills: 0 | COMMUNITY

## 2017-04-23 RX ORDER — TRAMADOL HYDROCHLORIDE 50 MG/1
0 TABLET ORAL
Qty: 0 | Refills: 0 | COMMUNITY

## 2017-04-23 RX ORDER — COLCHICINE 0.6 MG
1 TABLET ORAL
Qty: 0 | Refills: 0 | COMMUNITY
Start: 2017-04-23

## 2017-04-23 RX ORDER — FUROSEMIDE 40 MG
1 TABLET ORAL
Qty: 0 | Refills: 0 | COMMUNITY
Start: 2017-04-23

## 2017-04-23 RX ORDER — NYSTATIN CREAM 100000 [USP'U]/G
1 CREAM TOPICAL
Qty: 0 | Refills: 0 | COMMUNITY
Start: 2017-04-23

## 2017-04-23 RX ORDER — OXYCODONE HYDROCHLORIDE 5 MG/1
1 TABLET ORAL
Qty: 0 | Refills: 0 | COMMUNITY
Start: 2017-04-23

## 2017-04-23 RX ORDER — LIDOCAINE 4 G/100G
1 CREAM TOPICAL
Qty: 0 | Refills: 0 | COMMUNITY
Start: 2017-04-23

## 2017-04-23 RX ORDER — PANTOPRAZOLE SODIUM 20 MG/1
1 TABLET, DELAYED RELEASE ORAL
Qty: 0 | Refills: 0 | COMMUNITY
Start: 2017-04-23

## 2017-04-23 RX ORDER — CYCLOBENZAPRINE HYDROCHLORIDE 10 MG/1
10 TABLET, FILM COATED ORAL
Qty: 0 | Refills: 0 | COMMUNITY

## 2017-04-23 RX ORDER — AMIODARONE HYDROCHLORIDE 400 MG/1
1 TABLET ORAL
Qty: 0 | Refills: 0 | COMMUNITY

## 2017-04-23 RX ORDER — ALBUTEROL 90 UG/1
3 AEROSOL, METERED ORAL
Qty: 0 | Refills: 0 | COMMUNITY
Start: 2017-04-23

## 2017-04-23 RX ORDER — CYCLOBENZAPRINE HYDROCHLORIDE 10 MG/1
1 TABLET, FILM COATED ORAL
Qty: 0 | Refills: 0 | COMMUNITY
Start: 2017-04-23

## 2017-04-23 RX ORDER — LOPERAMIDE HCL 2 MG
1 TABLET ORAL
Qty: 0 | Refills: 0 | COMMUNITY
Start: 2017-04-23

## 2017-04-23 RX ORDER — WARFARIN SODIUM 2.5 MG/1
0 TABLET ORAL
Qty: 0 | Refills: 0 | COMMUNITY

## 2017-04-23 RX ORDER — ASPIRIN/CALCIUM CARB/MAGNESIUM 324 MG
1 TABLET ORAL
Qty: 0 | Refills: 0 | COMMUNITY
Start: 2017-04-23

## 2017-04-23 RX ORDER — ACETAMINOPHEN 500 MG
2 TABLET ORAL
Qty: 0 | Refills: 0 | COMMUNITY
Start: 2017-04-23

## 2017-04-23 RX ADMIN — PANTOPRAZOLE SODIUM 40 MILLIGRAM(S): 20 TABLET, DELAYED RELEASE ORAL at 05:34

## 2017-04-23 RX ADMIN — SODIUM CHLORIDE 3 MILLILITER(S): 9 INJECTION INTRAMUSCULAR; INTRAVENOUS; SUBCUTANEOUS at 05:35

## 2017-04-23 RX ADMIN — Medication 1 SUPPOSITORY(S): at 05:34

## 2017-04-23 RX ADMIN — NYSTATIN CREAM 1 APPLICATION(S): 100000 CREAM TOPICAL at 05:35

## 2017-04-23 RX ADMIN — Medication 50 MILLIGRAM(S): at 05:34

## 2017-04-23 RX ADMIN — Medication 100 MILLIGRAM(S): at 10:22

## 2017-04-23 RX ADMIN — Medication 100 MILLIGRAM(S): at 05:34

## 2017-04-23 RX ADMIN — Medication 325 MILLIGRAM(S): at 10:21

## 2017-04-23 RX ADMIN — LIDOCAINE 1 PATCH: 4 CREAM TOPICAL at 00:00

## 2017-04-23 RX ADMIN — AMLODIPINE BESYLATE 5 MILLIGRAM(S): 2.5 TABLET ORAL at 05:34

## 2017-04-23 RX ADMIN — Medication 40 MILLIGRAM(S): at 05:34

## 2017-04-23 RX ADMIN — Medication 1 TABLET(S): at 09:33

## 2017-04-23 RX ADMIN — CYCLOBENZAPRINE HYDROCHLORIDE 10 MILLIGRAM(S): 10 TABLET, FILM COATED ORAL at 10:21

## 2017-04-23 RX ADMIN — Medication 0.6 MILLIGRAM(S): at 05:34

## 2017-04-23 RX ADMIN — ALBUTEROL 2.5 MILLIGRAM(S): 90 AEROSOL, METERED ORAL at 08:35

## 2017-04-23 RX ADMIN — MAGNESIUM OXIDE 400 MG ORAL TABLET 400 MILLIGRAM(S): 241.3 TABLET ORAL at 09:33

## 2017-04-23 RX ADMIN — GABAPENTIN 300 MILLIGRAM(S): 400 CAPSULE ORAL at 05:34

## 2017-04-23 NOTE — DISCHARGE NOTE ADULT - MEDICATION SUMMARY - MEDICATIONS TO TAKE
I will START or STAY ON the medications listed below when I get home from the hospital:    acetaminophen 325 mg oral tablet  -- 2 tab(s) by mouth every 6 hours, As needed, Mild Pain (1 - 3)  -- Indication: For Pain     acetaminophen-oxycodone 325 mg-5 mg oral tablet  -- 1 tab(s) by mouth every 6 hours, As needed, Moderate Pain (4 - 6)  -- Indication: For Pain     aspirin 325 mg oral tablet  -- 1 tab(s) by mouth once a day  -- Indication: For Afib    gabapentin 300 mg oral capsule  --  by mouth 2 times a day  -- Indication: For Pain     loperamide 2 mg oral capsule  -- 1 cap(s) by mouth every 4 hours, As needed, Diarrhea  -- Indication: For Diarrhea    colchicine 0.6 mg oral tablet  -- 1 tab(s) by mouth 2 times a day  -- Indication: For Gout    allopurinol 100 mg oral tablet  --  by mouth once a day  -- Indication: For Gout    atorvastatin 10 mg oral tablet  -- 1 tab(s) by mouth once a day (at bedtime)  -- Indication: For HLD    metoprolol tartrate 50 mg oral tablet  -- 1 tab(s) by mouth 2 times a day  -- Indication: For HTN    albuterol 0.63 mg/3 mL (0.021%) inhalation solution  -- 3 milliliter(s) inhaled 3 times a day  -- Indication: For SOB    amLODIPine 5 mg oral tablet  -- 1 tab(s) by mouth once a day  -- Indication: For HTN    hydrocortisone 25 mg rectal suppository  -- 1 suppository(ies) rectally 2 times a day  -- Indication: For Hemmorhiods    Lidoderm 5% topical film  -- Apply on skin to affected area once a day  -- Indication: For Pain     nystatin 100,000 units/g topical powder  -- 1 application on skin 2 times a day  -- Indication: For Rashes    furosemide 40 mg oral tablet  -- 1 tab(s) by mouth 2 times a day  -- Indication: For ChF    guaiFENesin 100 mg/5 mL oral liquid  -- 5 milliliter(s) by mouth every 6 hours  -- Indication: For Cough    cyclobenzaprine 10 mg oral tablet  -- 1 tab(s) by mouth 3 times a day, As needed, Muscle Spasm  -- Indication: For Pain in the back.     lactobacillus acidophilus oral capsule  -- 1 cap(s) by mouth 3 times a day  -- Indication: For probiotics    pantoprazole 40 mg oral delayed release tablet  -- 1 tab(s) by mouth once a day (before a meal)  -- Indication: For GERD    levothyroxine 75 mcg (0.075 mg) oral capsule  -- 1 cap(s) by mouth once a day  -- Indication: For Hypothyriodism    Vitamin D2  -- 2000 unit(s) by mouth once a day  -- Indication: For Supplement

## 2017-04-23 NOTE — DISCHARGE NOTE ADULT - PROVIDER TOKENS
FREE:[LAST:[TESSA Gibbs],PHONE:[(   )    -],FAX:[(   )    -],ADDRESS:[[address] [address]  Renal: [place]  Cardio: [place]]]

## 2017-04-23 NOTE — DISCHARGE NOTE ADULT - MEDICATION SUMMARY - MEDICATIONS TO STOP TAKING
I will STOP taking the medications listed below when I get home from the hospital:    potassium chloride 20 mEq oral tablet, extended release  --  by mouth once a day

## 2017-04-23 NOTE — DISCHARGE NOTE ADULT - PATIENT PORTAL LINK FT
“You can access the [facility] Patient Portal, offered by [facility], by registering with the following website: [email or web address]”

## 2017-04-23 NOTE — DISCHARGE NOTE ADULT - PLAN OF CARE
take meds, low salt diet Low salt diet Low salt and low protien diet, follow up with nephrology Pain meds and PT completed treatment on aspirin and betablocker, no anticoagulation.

## 2017-04-23 NOTE — DISCHARGE NOTE ADULT - ADDITIONAL INSTRUCTIONS
Follow up with primary medical doctor in 1 week after discharge from rehab  Follow up with nephrology in 2 week, need to have BMP checked in 2 weeks  Follow up with cardiology in 2 to 3 weeks

## 2017-04-23 NOTE — DISCHARGE NOTE ADULT - HOSPITAL COURSE
78 y/o male with acute on chronic CHF of unclear etiology, Hx of Afib, HLD, HTN, Neuropathy, AVR, COPD, CKD-3, CAD s/p CABG, Gout, Chronic back pain admitted with acute chf with preserved EF. no sys or diastolic dysfunction on ECHO. was on coumadin, family decided to stop coumadin d/t fall risk, then they had a discussion with cardiologist as well and option provided, like watchman, as per cardiology aspirin 325mg daily, CHF improved with IV lasix, Echo done showed prior AVR in normal position with normal gradient, EF in 60 to 65%, no wall motion,  No ACE-I as creatinine is around 2, do not know the baseline, will continue to monitor. Cardiology saw the patient and followed over the course, Continued with BB, Norvasc, IV Lasix was changed to po lasix, kidney function has been stable, today creatinine is 1.82. patient is going to be lasix 40mg two times a day and need to have follow up BMP in 1 to 2 weeks, he still have some edema and is because of Hx of chronic lymphedema.    Patient was having symptoms of upper respiratory infection in the beginning of hospitalization, he was found to have  Influenza B +, patient was given supportive treatment and was also started on renally adjusted Tamiflu, he has completed course and symptoms has been resolved.   patient also has some back pain radiating to groin: he has Hx of lumber disc disease, he was continued with flexeril, and tramadol pain was not improving started on PRN percocet, topical lidoderm patch, pain management consult was called, patient has Hx of inguinal hernia, no signs of any obstruction or bulging on exam, patients pain remained controlled, he was provided PT, they recommended, rehab placement,  discussed with family and gave them options and patient is being discharged to [facility], during the course patient also complained of diarrhea, c.dif specimen came negative, lactobacillus acidophilus added, and was given immodium, his diarrhea improved, he also had some hemorrhoidal pain, with hemmorhiod cream it has been resolved.     Patient is doing better, his symptoms has been resolved, he is being discharged to [facility] acute rehab in a stable condition.     Vital Signs   T(C): 36.6  HR: 80   BP: 120/50 mmhg.   RR: 18  SpO2: 94%       PHYSICAL EXAM:    GENERAL: Elderly male looking comfortable sitting in chair.   HEENT: PERRL, +EOMI  NECK: soft, Supple, No JVD,   CHEST/LUNG: decrease air entry b/l; No wheezing  HEART: S1S2+, Regular rate and rhythm; No murmurs  ABDOMEN: Soft, Nontender, Nondistended; Bowel sounds present  EXTREMITIES:  2+ Peripheral Pulses, +ve edema  SKIN: No rashes or lesions  NEURO: AAOX3, no focal deficits, no motor or sensory loss  PSYCH: normal mood      Total time spent 45 minutes.

## 2017-04-23 NOTE — DISCHARGE NOTE ADULT - CARE PLAN
Principal Discharge DX:	Acute on chronic congestive heart failure, unspecified congestive heart failure type  Goal:	take meds, low salt diet  Instructions for follow-up, activity and diet:	Low salt diet  Secondary Diagnosis:	Kidney disease  Goal:	Low salt and low protien diet, follow up with nephrology  Secondary Diagnosis:	Lumbar disc disease  Goal:	Pain meds and PT  Secondary Diagnosis:	Flu  Goal:	completed treatment  Secondary Diagnosis:	Atrial fibrillation, unspecified type  Goal:	on aspirin and betablocker, no anticoagulation.

## 2017-04-23 NOTE — DISCHARGE NOTE ADULT - MEDICATION SUMMARY - MEDICATIONS TO CHANGE
I will SWITCH the dose or number of times a day I take the medications listed below when I get home from the hospital:    PriLOSEC 40 mg oral delayed release capsule  -- 1 cap(s) by mouth once a day

## 2017-04-25 ENCOUNTER — APPOINTMENT (OUTPATIENT)
Dept: VASCULAR SURGERY | Facility: CLINIC | Age: 77
End: 2017-04-25

## 2017-05-15 LAB
BACTERIA FLD CULT: NORMAL
CRYSTALS SNV QL MICRO: NORMAL

## 2017-05-25 ENCOUNTER — APPOINTMENT (OUTPATIENT)
Dept: PULMONOLOGY | Facility: CLINIC | Age: 77
End: 2017-05-25

## 2017-06-12 ENCOUNTER — APPOINTMENT (OUTPATIENT)
Dept: NEPHROLOGY | Facility: CLINIC | Age: 77
End: 2017-06-12

## 2018-04-10 NOTE — ED PROVIDER NOTE - PMH
No change to Rx. AAA (abdominal aortic aneurysm)    Afib    COPD (chronic obstructive pulmonary disease)    Kidney disease    Lumbar disc disease    Neuropathy

## 2021-07-12 NOTE — ED ADULT NURSE NOTE - PSH
Surgeon/Pathologist Verbiage (Will Incorporate Name Of Surgeon From Intro If Not Blank): operated in two distinct and integrated capacities as the surgeon and pathologist. Aortic valve replaced    S/P appendectomy    S/P CABG x 1

## 2022-11-23 NOTE — ED PROVIDER NOTE - CHPI ED SYMPTOMS POS
No new care gaps identified.  [facility] Embedded Care Gaps. Reference number: [id number]. 11/23/2022   3:27:42 PM CST   DIFFICULTY BEARING WEIGHT/PAIN

## 2024-05-09 NOTE — PROGRESS NOTE ADULT - PROBLEM SELECTOR PLAN 8
Tamiflu
completed course of Tamiflu
Yes - the patient is able to be screened

## 2025-07-14 NOTE — PHYSICAL THERAPY INITIAL EVALUATION ADULT - GENERAL OBSERVATIONS, REHAB EVAL
Pt received supine in bed, + telemetry, on RA (SPO2 92%) no c/o pain, agreeable to PT normal... Well appearing, awake, alert, oriented to person, place, time/situation and in no apparent distress.